# Patient Record
Sex: MALE | Race: WHITE | Employment: OTHER | URBAN - METROPOLITAN AREA
[De-identification: names, ages, dates, MRNs, and addresses within clinical notes are randomized per-mention and may not be internally consistent; named-entity substitution may affect disease eponyms.]

---

## 2018-01-01 ENCOUNTER — APPOINTMENT (OUTPATIENT)
Dept: GENERAL RADIOLOGY | Age: 63
End: 2018-01-01
Attending: INTERNAL MEDICINE

## 2018-01-01 ENCOUNTER — HOSPITAL ENCOUNTER (OUTPATIENT)
Dept: GENERAL RADIOLOGY | Age: 63
Discharge: HOME | End: 2018-12-26
Attending: INTERNAL MEDICINE

## 2018-01-01 ENCOUNTER — HOSPITAL ENCOUNTER (OUTPATIENT)
Dept: GENERAL RADIOLOGY | Age: 63
Discharge: HOME | End: 2018-12-27
Attending: INTERNAL MEDICINE

## 2018-01-01 ENCOUNTER — HOSPITAL ENCOUNTER (OUTPATIENT)
Age: 63
Discharge: ANOTHER HOSPITAL | End: 2019-01-17
Attending: INTERNAL MEDICINE | Admitting: INTERNAL MEDICINE

## 2018-01-01 ENCOUNTER — HOSPITAL ENCOUNTER (OUTPATIENT)
Dept: GENERAL RADIOLOGY | Age: 63
End: 2018-01-01
Attending: INTERNAL MEDICINE

## 2018-01-01 ENCOUNTER — HOSPITAL ENCOUNTER (OUTPATIENT)
Dept: GENERAL RADIOLOGY | Age: 63
Discharge: HOME | End: 2018-12-28
Attending: INTERNAL MEDICINE

## 2018-01-01 LAB
ALBUMIN SERPL-MCNC: 1.9 G/DL (ref 3.2–4.6)
ALBUMIN SERPL-MCNC: 2.5 G/DL (ref 3.2–4.6)
ALBUMIN/GLOB SERPL: 0.5 {RATIO} (ref 1.2–3.5)
ALBUMIN/GLOB SERPL: 0.7 {RATIO} (ref 1.2–3.5)
ALP SERPL-CCNC: 108 U/L (ref 50–136)
ALP SERPL-CCNC: 98 U/L (ref 50–136)
ALT SERPL-CCNC: 37 U/L (ref 12–65)
ALT SERPL-CCNC: 38 U/L (ref 12–65)
ANION GAP SERPL CALC-SCNC: 5 MMOL/L (ref 7–16)
ANION GAP SERPL CALC-SCNC: 6 MMOL/L (ref 7–16)
ANION GAP SERPL CALC-SCNC: 8 MMOL/L (ref 7–16)
ANION GAP SERPL CALC-SCNC: 8 MMOL/L (ref 7–16)
AST SERPL-CCNC: 12 U/L (ref 15–37)
AST SERPL-CCNC: 23 U/L (ref 15–37)
BASOPHILS # BLD: 0 K/UL (ref 0–0.2)
BASOPHILS NFR BLD: 0 % (ref 0–2)
BILIRUB SERPL-MCNC: 0.3 MG/DL (ref 0.2–1.1)
BILIRUB SERPL-MCNC: 0.4 MG/DL (ref 0.2–1.1)
BNP SERPL-MCNC: 305 PG/ML
BNP SERPL-MCNC: 369 PG/ML
BUN SERPL-MCNC: 23 MG/DL (ref 8–23)
BUN SERPL-MCNC: 25 MG/DL (ref 8–23)
BUN SERPL-MCNC: 33 MG/DL (ref 8–23)
BUN SERPL-MCNC: 33 MG/DL (ref 8–23)
CALCIUM SERPL-MCNC: 8.1 MG/DL (ref 8.3–10.4)
CALCIUM SERPL-MCNC: 8.5 MG/DL (ref 8.3–10.4)
CHLORIDE SERPL-SCNC: 100 MMOL/L (ref 98–107)
CHLORIDE SERPL-SCNC: 101 MMOL/L (ref 98–107)
CO2 SERPL-SCNC: 29 MMOL/L (ref 21–32)
CO2 SERPL-SCNC: 29 MMOL/L (ref 21–32)
CO2 SERPL-SCNC: 31 MMOL/L (ref 21–32)
CO2 SERPL-SCNC: 33 MMOL/L (ref 21–32)
CREAT SERPL-MCNC: 0.82 MG/DL (ref 0.8–1.5)
CREAT SERPL-MCNC: 0.94 MG/DL (ref 0.8–1.5)
CREAT SERPL-MCNC: 0.98 MG/DL (ref 0.8–1.5)
CREAT SERPL-MCNC: 1.05 MG/DL (ref 0.8–1.5)
DIFFERENTIAL METHOD BLD: ABNORMAL
EOSINOPHIL # BLD: 0.1 K/UL (ref 0–0.8)
EOSINOPHIL # BLD: 0.1 K/UL (ref 0–0.8)
EOSINOPHIL # BLD: 0.2 K/UL (ref 0–0.8)
EOSINOPHIL # BLD: 0.4 K/UL (ref 0–0.8)
EOSINOPHIL NFR BLD: 2 % (ref 0.5–7.8)
EOSINOPHIL NFR BLD: 6 % (ref 0.5–7.8)
ERYTHROCYTE [DISTWIDTH] IN BLOOD BY AUTOMATED COUNT: 13.6 % (ref 11.9–14.6)
ERYTHROCYTE [DISTWIDTH] IN BLOOD BY AUTOMATED COUNT: 13.7 % (ref 11.9–14.6)
ERYTHROCYTE [DISTWIDTH] IN BLOOD BY AUTOMATED COUNT: 13.7 % (ref 11.9–14.6)
ERYTHROCYTE [DISTWIDTH] IN BLOOD BY AUTOMATED COUNT: 13.8 % (ref 11.9–14.6)
GLOBULIN SER CALC-MCNC: 3.5 G/DL (ref 2.3–3.5)
GLOBULIN SER CALC-MCNC: 3.5 G/DL (ref 2.3–3.5)
GLUCOSE SERPL-MCNC: 101 MG/DL (ref 65–100)
GLUCOSE SERPL-MCNC: 108 MG/DL (ref 65–100)
GLUCOSE SERPL-MCNC: 130 MG/DL (ref 65–100)
GLUCOSE SERPL-MCNC: 130 MG/DL (ref 65–100)
HCT VFR BLD AUTO: 26.2 % (ref 41.1–50.3)
HCT VFR BLD AUTO: 27.1 % (ref 41.1–50.3)
HCT VFR BLD AUTO: 29.5 % (ref 41.1–50.3)
HCT VFR BLD AUTO: 31.1 % (ref 41.1–50.3)
HGB BLD-MCNC: 8.1 G/DL (ref 13.6–17.2)
HGB BLD-MCNC: 8.7 G/DL (ref 13.6–17.2)
HGB BLD-MCNC: 9.2 G/DL (ref 13.6–17.2)
HGB BLD-MCNC: 9.6 G/DL (ref 13.6–17.2)
IMM GRANULOCYTES # BLD: 0.1 K/UL (ref 0–0.5)
IMM GRANULOCYTES NFR BLD AUTO: 1 % (ref 0–5)
LYMPHOCYTES # BLD: 0.4 K/UL (ref 0.5–4.6)
LYMPHOCYTES # BLD: 0.8 K/UL (ref 0.5–4.6)
LYMPHOCYTES NFR BLD: 10 % (ref 13–44)
LYMPHOCYTES NFR BLD: 11 % (ref 13–44)
LYMPHOCYTES NFR BLD: 6 % (ref 13–44)
LYMPHOCYTES NFR BLD: 9 % (ref 13–44)
MAGNESIUM SERPL-MCNC: 1.9 MG/DL (ref 1.8–2.4)
MCH RBC QN AUTO: 32.5 PG (ref 26.1–32.9)
MCH RBC QN AUTO: 32.7 PG (ref 26.1–32.9)
MCH RBC QN AUTO: 32.7 PG (ref 26.1–32.9)
MCH RBC QN AUTO: 33.1 PG (ref 26.1–32.9)
MCHC RBC AUTO-ENTMCNC: 30.9 G/DL (ref 31.4–35)
MCHC RBC AUTO-ENTMCNC: 30.9 G/DL (ref 31.4–35)
MCHC RBC AUTO-ENTMCNC: 31.2 G/DL (ref 31.4–35)
MCHC RBC AUTO-ENTMCNC: 32.1 G/DL (ref 31.4–35)
MCV RBC AUTO: 103 FL (ref 79.6–97.8)
MCV RBC AUTO: 105 FL (ref 79.6–97.8)
MCV RBC AUTO: 105.2 FL (ref 79.6–97.8)
MCV RBC AUTO: 105.8 FL (ref 79.6–97.8)
MONOCYTES # BLD: 0.4 K/UL (ref 0.1–1.3)
MONOCYTES # BLD: 0.6 K/UL (ref 0.1–1.3)
MONOCYTES NFR BLD: 5 % (ref 4–12)
MONOCYTES NFR BLD: 7 % (ref 4–12)
MONOCYTES NFR BLD: 8 % (ref 4–12)
MONOCYTES NFR BLD: 8 % (ref 4–12)
NEUTS SEG # BLD: 5 K/UL (ref 1.7–8.2)
NEUTS SEG # BLD: 6.1 K/UL (ref 1.7–8.2)
NEUTS SEG # BLD: 6.8 K/UL (ref 1.7–8.2)
NEUTS SEG # BLD: 6.9 K/UL (ref 1.7–8.2)
NEUTS SEG NFR BLD: 74 % (ref 43–78)
NEUTS SEG NFR BLD: 81 % (ref 43–78)
NEUTS SEG NFR BLD: 83 % (ref 43–78)
NEUTS SEG NFR BLD: 83 % (ref 43–78)
NRBC # BLD: 0 K/UL (ref 0–0.2)
PLATELET # BLD AUTO: 243 K/UL (ref 150–450)
PLATELET # BLD AUTO: 263 K/UL (ref 150–450)
PLATELET # BLD AUTO: 285 K/UL (ref 150–450)
PLATELET # BLD AUTO: 294 K/UL (ref 150–450)
PMV BLD AUTO: 10.1 FL (ref 9.4–12.3)
PMV BLD AUTO: 10.4 FL (ref 9.4–12.3)
PMV BLD AUTO: 10.4 FL (ref 9.4–12.3)
PMV BLD AUTO: 9.8 FL (ref 9.4–12.3)
POTASSIUM SERPL-SCNC: 3.8 MMOL/L (ref 3.5–5.1)
POTASSIUM SERPL-SCNC: 3.9 MMOL/L (ref 3.5–5.1)
POTASSIUM SERPL-SCNC: 4.2 MMOL/L (ref 3.5–5.1)
POTASSIUM SERPL-SCNC: 4.3 MMOL/L (ref 3.5–5.1)
PROCALCITONIN SERPL-MCNC: 0.1 NG/ML
PROT SERPL-MCNC: 5.4 G/DL (ref 6.3–8.2)
PROT SERPL-MCNC: 6 G/DL (ref 6.3–8.2)
RBC # BLD AUTO: 2.49 M/UL (ref 4.23–5.6)
RBC # BLD AUTO: 2.63 M/UL (ref 4.23–5.6)
RBC # BLD AUTO: 2.81 M/UL (ref 4.23–5.6)
RBC # BLD AUTO: 2.94 M/UL (ref 4.23–5.6)
SODIUM SERPL-SCNC: 137 MMOL/L (ref 136–145)
SODIUM SERPL-SCNC: 138 MMOL/L (ref 136–145)
SODIUM SERPL-SCNC: 138 MMOL/L (ref 136–145)
SODIUM SERPL-SCNC: 139 MMOL/L (ref 136–145)
WBC # BLD AUTO: 6.8 K/UL (ref 4.3–11.1)
WBC # BLD AUTO: 7.4 K/UL (ref 4.3–11.1)
WBC # BLD AUTO: 8.4 K/UL (ref 4.3–11.1)
WBC # BLD AUTO: 8.5 K/UL (ref 4.3–11.1)

## 2018-01-01 PROCEDURE — 85025 COMPLETE CBC W/AUTO DIFF WBC: CPT

## 2018-01-01 PROCEDURE — 71045 X-RAY EXAM CHEST 1 VIEW: CPT

## 2018-01-01 PROCEDURE — 83880 ASSAY OF NATRIURETIC PEPTIDE: CPT

## 2018-01-01 PROCEDURE — 36415 COLL VENOUS BLD VENIPUNCTURE: CPT

## 2018-01-01 PROCEDURE — 80053 COMPREHEN METABOLIC PANEL: CPT

## 2018-01-01 PROCEDURE — 80048 BASIC METABOLIC PNL TOTAL CA: CPT

## 2018-01-01 PROCEDURE — 83735 ASSAY OF MAGNESIUM: CPT

## 2018-01-01 PROCEDURE — 84145 PROCALCITONIN (PCT): CPT

## 2018-01-01 PROCEDURE — 93312 ECHO TRANSESOPHAGEAL: CPT

## 2019-01-01 ENCOUNTER — APPOINTMENT (OUTPATIENT)
Dept: GENERAL RADIOLOGY | Age: 64
DRG: 871 | End: 2019-01-01
Attending: INTERNAL MEDICINE
Payer: MEDICARE

## 2019-01-01 ENCOUNTER — APPOINTMENT (OUTPATIENT)
Dept: CT IMAGING | Age: 64
End: 2019-01-01
Attending: NURSE PRACTITIONER

## 2019-01-01 ENCOUNTER — APPOINTMENT (OUTPATIENT)
Dept: GENERAL RADIOLOGY | Age: 64
End: 2019-01-01
Attending: INTERNAL MEDICINE

## 2019-01-01 ENCOUNTER — APPOINTMENT (OUTPATIENT)
Dept: GENERAL RADIOLOGY | Age: 64
End: 2019-01-01
Attending: NURSE PRACTITIONER

## 2019-01-01 ENCOUNTER — HOSPITAL ENCOUNTER (INPATIENT)
Age: 64
LOS: 1 days | DRG: 871 | End: 2019-01-18
Attending: INTERNAL MEDICINE | Admitting: INTERNAL MEDICINE
Payer: MEDICARE

## 2019-01-01 VITALS
DIASTOLIC BLOOD PRESSURE: 44 MMHG | BODY MASS INDEX: 29.84 KG/M2 | OXYGEN SATURATION: 52 % | SYSTOLIC BLOOD PRESSURE: 96 MMHG | WEIGHT: 201.5 LBS | HEIGHT: 69 IN | TEMPERATURE: 97.7 F | RESPIRATION RATE: 7 BRPM

## 2019-01-01 DIAGNOSIS — I46.9 CARDIAC ARREST (HCC): ICD-10-CM

## 2019-01-01 DIAGNOSIS — R65.21 SEVERE SEPSIS WITH SEPTIC SHOCK (HCC): ICD-10-CM

## 2019-01-01 DIAGNOSIS — R41.82 ALTERED MENTAL STATUS, UNSPECIFIED ALTERED MENTAL STATUS TYPE: ICD-10-CM

## 2019-01-01 DIAGNOSIS — I95.9 HYPOTENSION, UNSPECIFIED HYPOTENSION TYPE: ICD-10-CM

## 2019-01-01 DIAGNOSIS — B95.62 BACTEREMIA DUE TO METHICILLIN RESISTANT STAPHYLOCOCCUS AUREUS: ICD-10-CM

## 2019-01-01 DIAGNOSIS — Z51.5 ENCOUNTER FOR PALLIATIVE CARE: ICD-10-CM

## 2019-01-01 DIAGNOSIS — N17.9 AKI (ACUTE KIDNEY INJURY) (HCC): ICD-10-CM

## 2019-01-01 DIAGNOSIS — A41.9 SEVERE SEPSIS WITH SEPTIC SHOCK (HCC): ICD-10-CM

## 2019-01-01 DIAGNOSIS — R78.81 BACTEREMIA DUE TO METHICILLIN RESISTANT STAPHYLOCOCCUS AUREUS: ICD-10-CM

## 2019-01-01 DIAGNOSIS — J96.01 ACUTE RESPIRATORY FAILURE WITH HYPOXIA (HCC): ICD-10-CM

## 2019-01-01 DIAGNOSIS — J86.9 EMPYEMA LUNG (HCC): ICD-10-CM

## 2019-01-01 DIAGNOSIS — F41.9 ANXIETY: ICD-10-CM

## 2019-01-01 DIAGNOSIS — J44.9 CHRONIC OBSTRUCTIVE PULMONARY DISEASE, UNSPECIFIED COPD TYPE (HCC): Chronic | ICD-10-CM

## 2019-01-01 LAB
ABO + RH BLD: NORMAL
ALBUMIN SERPL-MCNC: 1.3 G/DL (ref 3.2–4.6)
ALBUMIN SERPL-MCNC: 1.3 G/DL (ref 3.2–4.6)
ALBUMIN SERPL-MCNC: 1.7 G/DL (ref 3.2–4.6)
ALBUMIN SERPL-MCNC: 2 G/DL (ref 3.2–4.6)
ALBUMIN/GLOB SERPL: 0.3 {RATIO} (ref 1.2–3.5)
ALBUMIN/GLOB SERPL: 0.4 {RATIO} (ref 1.2–3.5)
ALBUMIN/GLOB SERPL: 0.4 {RATIO} (ref 1.2–3.5)
ALBUMIN/GLOB SERPL: 0.5 {RATIO} (ref 1.2–3.5)
ALP SERPL-CCNC: 122 U/L (ref 50–136)
ALP SERPL-CCNC: 153 U/L (ref 50–136)
ALP SERPL-CCNC: 68 U/L (ref 50–136)
ALP SERPL-CCNC: 89 U/L (ref 50–136)
ALT SERPL-CCNC: 29 U/L (ref 12–65)
ALT SERPL-CCNC: 33 U/L (ref 12–65)
ALT SERPL-CCNC: 42 U/L (ref 12–65)
ALT SERPL-CCNC: 50 U/L (ref 12–65)
ANION GAP SERPL CALC-SCNC: 1 MMOL/L (ref 7–16)
ANION GAP SERPL CALC-SCNC: 10 MMOL/L (ref 7–16)
ANION GAP SERPL CALC-SCNC: 4 MMOL/L (ref 7–16)
ANION GAP SERPL CALC-SCNC: 5 MMOL/L (ref 7–16)
ANION GAP SERPL CALC-SCNC: 6 MMOL/L (ref 7–16)
ANION GAP SERPL CALC-SCNC: 6 MMOL/L (ref 7–16)
ANION GAP SERPL CALC-SCNC: 7 MMOL/L (ref 7–16)
ANION GAP SERPL CALC-SCNC: 8 MMOL/L (ref 7–16)
APPEARANCE FLD: NORMAL
ARTERIAL PATENCY WRIST A: ABNORMAL
AST SERPL-CCNC: 15 U/L (ref 15–37)
AST SERPL-CCNC: 27 U/L (ref 15–37)
AST SERPL-CCNC: 27 U/L (ref 15–37)
AST SERPL-CCNC: 32 U/L (ref 15–37)
ATRIAL RATE: 26 BPM
BACTERIA SPEC CULT: ABNORMAL
BACTERIA SPEC CULT: ABNORMAL
BASE DEFICIT BLD-SCNC: 2 MMOL/L
BASE DEFICIT BLD-SCNC: 5 MMOL/L
BASE DEFICIT BLD-SCNC: 5 MMOL/L
BASE DEFICIT BLD-SCNC: 7 MMOL/L
BASE DEFICIT BLD-SCNC: 7 MMOL/L
BASOPHILS # BLD: 0 K/UL (ref 0–0.2)
BASOPHILS # BLD: 0.1 K/UL (ref 0–0.2)
BASOPHILS NFR BLD: 0 % (ref 0–2)
BASOPHILS NFR BLD: 1 % (ref 0–2)
BDY SITE: ABNORMAL
BILIRUB SERPL-MCNC: 0.2 MG/DL (ref 0.2–1.1)
BILIRUB SERPL-MCNC: 0.3 MG/DL (ref 0.2–1.1)
BILIRUB SERPL-MCNC: 0.5 MG/DL (ref 0.2–1.1)
BILIRUB SERPL-MCNC: 0.6 MG/DL (ref 0.2–1.1)
BLD PROD TYP BPU: NORMAL
BLOOD GROUP ANTIBODIES SERPL: NORMAL
BNP SERPL-MCNC: 424 PG/ML
BNP SERPL-MCNC: 449 PG/ML
BNP SERPL-MCNC: 581 PG/ML
BNP SERPL-MCNC: 756 PG/ML
BODY TEMPERATURE: 98.6
BPU ID: NORMAL
BUN SERPL-MCNC: 104 MG/DL (ref 8–23)
BUN SERPL-MCNC: 107 MG/DL (ref 8–23)
BUN SERPL-MCNC: 107 MG/DL (ref 8–23)
BUN SERPL-MCNC: 109 MG/DL (ref 8–23)
BUN SERPL-MCNC: 26 MG/DL (ref 8–23)
BUN SERPL-MCNC: 28 MG/DL (ref 8–23)
BUN SERPL-MCNC: 29 MG/DL (ref 8–23)
BUN SERPL-MCNC: 30 MG/DL (ref 8–23)
BUN SERPL-MCNC: 32 MG/DL (ref 8–23)
BUN SERPL-MCNC: 34 MG/DL (ref 8–23)
BUN SERPL-MCNC: 38 MG/DL (ref 8–23)
BUN SERPL-MCNC: 50 MG/DL (ref 8–23)
BUN SERPL-MCNC: 94 MG/DL (ref 8–23)
CA-I BLD-MCNC: 4 MG/DL (ref 4–5.2)
CALCIUM SERPL-MCNC: 6.1 MG/DL (ref 8.3–10.4)
CALCIUM SERPL-MCNC: 6.9 MG/DL (ref 8.3–10.4)
CALCIUM SERPL-MCNC: 7 MG/DL (ref 8.3–10.4)
CALCIUM SERPL-MCNC: 8 MG/DL (ref 8.3–10.4)
CALCIUM SERPL-MCNC: 8 MG/DL (ref 8.3–10.4)
CALCIUM SERPL-MCNC: 8.4 MG/DL (ref 8.3–10.4)
CALCIUM SERPL-MCNC: 8.6 MG/DL (ref 8.3–10.4)
CALCIUM SERPL-MCNC: 8.6 MG/DL (ref 8.3–10.4)
CALCIUM SERPL-MCNC: 8.7 MG/DL (ref 8.3–10.4)
CALCIUM SERPL-MCNC: 8.8 MG/DL (ref 8.3–10.4)
CALCIUM SERPL-MCNC: 8.9 MG/DL (ref 8.3–10.4)
CALCULATED R AXIS, ECG10: 98 DEGREES
CALCULATED T AXIS, ECG11: 68 DEGREES
CHLORIDE SERPL-SCNC: 100 MMOL/L (ref 98–107)
CHLORIDE SERPL-SCNC: 104 MMOL/L (ref 98–107)
CHLORIDE SERPL-SCNC: 110 MMOL/L (ref 98–107)
CHLORIDE SERPL-SCNC: 89 MMOL/L (ref 98–107)
CHLORIDE SERPL-SCNC: 90 MMOL/L (ref 98–107)
CHLORIDE SERPL-SCNC: 90 MMOL/L (ref 98–107)
CHLORIDE SERPL-SCNC: 91 MMOL/L (ref 98–107)
CHLORIDE SERPL-SCNC: 94 MMOL/L (ref 98–107)
CHLORIDE SERPL-SCNC: 99 MMOL/L (ref 98–107)
CHYLOMICRON SCREEN, CHYLMT: NORMAL
CO2 BLD-SCNC: 19 MMOL/L
CO2 BLD-SCNC: 22 MMOL/L
CO2 BLD-SCNC: 23 MMOL/L
CO2 BLD-SCNC: 26 MMOL/L
CO2 BLD-SCNC: 27 MMOL/L
CO2 SERPL-SCNC: 21 MMOL/L (ref 21–32)
CO2 SERPL-SCNC: 26 MMOL/L (ref 21–32)
CO2 SERPL-SCNC: 29 MMOL/L (ref 21–32)
CO2 SERPL-SCNC: 33 MMOL/L (ref 21–32)
CO2 SERPL-SCNC: 33 MMOL/L (ref 21–32)
CO2 SERPL-SCNC: 36 MMOL/L (ref 21–32)
CO2 SERPL-SCNC: 37 MMOL/L (ref 21–32)
CO2 SERPL-SCNC: 37 MMOL/L (ref 21–32)
CO2 SERPL-SCNC: 38 MMOL/L (ref 21–32)
CO2 SERPL-SCNC: 39 MMOL/L (ref 21–32)
CO2 SERPL-SCNC: 40 MMOL/L (ref 21–32)
COLLECT TIME,HTIME: 1309
COLLECT TIME,HTIME: 1740
COLLECT TIME,HTIME: 1850
COLLECT TIME,HTIME: 2104
COLLECT TIME,HTIME: 235
COLOR FLD: NORMAL
CREAT SERPL-MCNC: 0.78 MG/DL (ref 0.8–1.5)
CREAT SERPL-MCNC: 0.81 MG/DL (ref 0.8–1.5)
CREAT SERPL-MCNC: 0.86 MG/DL (ref 0.8–1.5)
CREAT SERPL-MCNC: 0.88 MG/DL (ref 0.8–1.5)
CREAT SERPL-MCNC: 0.91 MG/DL (ref 0.8–1.5)
CREAT SERPL-MCNC: 0.96 MG/DL (ref 0.8–1.5)
CREAT SERPL-MCNC: 0.98 MG/DL (ref 0.8–1.5)
CREAT SERPL-MCNC: 0.99 MG/DL (ref 0.8–1.5)
CREAT SERPL-MCNC: 1.56 MG/DL (ref 0.8–1.5)
CREAT SERPL-MCNC: 1.78 MG/DL (ref 0.8–1.5)
CREAT SERPL-MCNC: 1.88 MG/DL (ref 0.8–1.5)
CREAT SERPL-MCNC: 1.95 MG/DL (ref 0.8–1.5)
CREAT SERPL-MCNC: 2.04 MG/DL (ref 0.8–1.5)
CROSSMATCH RESULT,%XM: NORMAL
CRP SERPL-MCNC: 12 MG/DL (ref 0–0.9)
DIAGNOSIS, 93000: NORMAL
DIFFERENTIAL METHOD BLD: ABNORMAL
EOSINOPHIL # BLD: 0 K/UL (ref 0–0.8)
EOSINOPHIL # BLD: 0.3 K/UL (ref 0–0.8)
EOSINOPHIL # BLD: 0.3 K/UL (ref 0–0.8)
EOSINOPHIL NFR BLD MANUAL: 3 % (ref 1–8)
EOSINOPHIL NFR BLD: 0 % (ref 0.5–7.8)
EOSINOPHIL NFR BLD: 3 % (ref 0.5–7.8)
ERYTHROCYTE [DISTWIDTH] IN BLOOD BY AUTOMATED COUNT: 13.6 % (ref 11.9–14.6)
ERYTHROCYTE [DISTWIDTH] IN BLOOD BY AUTOMATED COUNT: 14.3 % (ref 11.9–14.6)
ERYTHROCYTE [DISTWIDTH] IN BLOOD BY AUTOMATED COUNT: 14.6 % (ref 11.9–14.6)
ERYTHROCYTE [DISTWIDTH] IN BLOOD BY AUTOMATED COUNT: 15.1 % (ref 11.9–14.6)
ERYTHROCYTE [DISTWIDTH] IN BLOOD BY AUTOMATED COUNT: 16.3 % (ref 11.9–14.6)
ERYTHROCYTE [DISTWIDTH] IN BLOOD BY AUTOMATED COUNT: 16.4 % (ref 11.9–14.6)
ERYTHROCYTE [DISTWIDTH] IN BLOOD BY AUTOMATED COUNT: 16.4 % (ref 11.9–14.6)
ERYTHROCYTE [DISTWIDTH] IN BLOOD BY AUTOMATED COUNT: 17.4 % (ref 11.9–14.6)
EXHALED MINUTE VOLUME, VE: 11.1 L/MIN
EXHALED MINUTE VOLUME, VE: 11.9 L/MIN
EXHALED MINUTE VOLUME, VE: 12.1 L/MIN
EXHALED MINUTE VOLUME, VE: 14.8 L/MIN
FERRITIN SERPL-MCNC: 389 NG/ML (ref 8–388)
FOLATE SERPL-MCNC: 17.4 NG/ML (ref 3.1–17.5)
GAS FLOW.O2 O2 DELIVERY SYS: ABNORMAL L/MIN
GAS FLOW.O2 SETTING OXYMISER: 18 BPM
GAS FLOW.O2 SETTING OXYMISER: 20 BPM
GLOBULIN SER CALC-MCNC: 3.2 G/DL (ref 2.3–3.5)
GLOBULIN SER CALC-MCNC: 3.8 G/DL (ref 2.3–3.5)
GLOBULIN SER CALC-MCNC: 4.3 G/DL (ref 2.3–3.5)
GLOBULIN SER CALC-MCNC: 4.5 G/DL (ref 2.3–3.5)
GLUCOSE BLD STRIP.AUTO-MCNC: 184 MG/DL (ref 65–100)
GLUCOSE FLD-MCNC: 9 MG/DL
GLUCOSE SERPL-MCNC: 123 MG/DL (ref 65–100)
GLUCOSE SERPL-MCNC: 127 MG/DL (ref 65–100)
GLUCOSE SERPL-MCNC: 134 MG/DL (ref 65–100)
GLUCOSE SERPL-MCNC: 134 MG/DL (ref 65–100)
GLUCOSE SERPL-MCNC: 138 MG/DL (ref 65–100)
GLUCOSE SERPL-MCNC: 139 MG/DL (ref 65–100)
GLUCOSE SERPL-MCNC: 140 MG/DL (ref 65–100)
GLUCOSE SERPL-MCNC: 141 MG/DL (ref 65–100)
GLUCOSE SERPL-MCNC: 145 MG/DL (ref 65–100)
GLUCOSE SERPL-MCNC: 153 MG/DL (ref 65–100)
GLUCOSE SERPL-MCNC: 155 MG/DL (ref 65–100)
GLUCOSE SERPL-MCNC: 169 MG/DL (ref 65–100)
GLUCOSE SERPL-MCNC: 190 MG/DL (ref 65–100)
GRAM STN SPEC: ABNORMAL
GRAM STN SPEC: ABNORMAL
HCO3 BLD-SCNC: 17.7 MMOL/L (ref 22–26)
HCO3 BLD-SCNC: 20.6 MMOL/L (ref 22–26)
HCO3 BLD-SCNC: 21.6 MMOL/L (ref 22–26)
HCO3 BLD-SCNC: 24.5 MMOL/L (ref 22–26)
HCO3 BLD-SCNC: 25.1 MMOL/L (ref 22–26)
HCT VFR BLD AUTO: 24.2 % (ref 41.1–50.3)
HCT VFR BLD AUTO: 27.7 % (ref 41.1–50.3)
HCT VFR BLD AUTO: 27.8 % (ref 41.1–50.3)
HCT VFR BLD AUTO: 28 % (ref 41.1–50.3)
HCT VFR BLD AUTO: 28.4 % (ref 41.1–50.3)
HCT VFR BLD AUTO: 30 % (ref 41.1–50.3)
HCT VFR BLD AUTO: 31.6 % (ref 41.1–50.3)
HCT VFR BLD AUTO: 31.8 % (ref 41.1–50.3)
HGB BLD-MCNC: 7.3 G/DL (ref 13.6–17.2)
HGB BLD-MCNC: 8.5 G/DL (ref 13.6–17.2)
HGB BLD-MCNC: 8.6 G/DL (ref 13.6–17.2)
HGB BLD-MCNC: 8.6 G/DL (ref 13.6–17.2)
HGB BLD-MCNC: 8.9 G/DL (ref 13.6–17.2)
HGB BLD-MCNC: 9.2 G/DL (ref 13.6–17.2)
HGB BLD-MCNC: 9.4 G/DL (ref 13.6–17.2)
HGB BLD-MCNC: 9.8 G/DL (ref 13.6–17.2)
IMM GRANULOCYTES # BLD AUTO: 0 K/UL (ref 0–0.5)
IMM GRANULOCYTES # BLD AUTO: 0.1 K/UL (ref 0–0.5)
IMM GRANULOCYTES # BLD AUTO: 0.1 K/UL (ref 0–0.5)
IMM GRANULOCYTES # BLD: 0.1 K/UL (ref 0–0.5)
IMM GRANULOCYTES NFR BLD AUTO: 1 % (ref 0–5)
INSPIRATION.DURATION SETTING TIME VENT: 2.1 SEC
INSPIRATION.DURATION SETTING TIME VENT: 2.2 SEC
IRON SATN MFR SERPL: 6 %
IRON SERPL-MCNC: 11 UG/DL (ref 35–150)
LACTATE SERPL-SCNC: 2.2 MMOL/L (ref 0.4–2)
LDH FLD L TO P-CCNC: >3325 U/L
LYMPHOCYTES # BLD: 0.4 K/UL (ref 0.5–4.6)
LYMPHOCYTES # BLD: 0.4 K/UL (ref 0.5–4.6)
LYMPHOCYTES # BLD: 0.5 K/UL (ref 0.5–4.6)
LYMPHOCYTES # BLD: 0.8 K/UL (ref 0.5–4.6)
LYMPHOCYTES # BLD: 0.9 K/UL (ref 0.5–4.6)
LYMPHOCYTES NFR BLD MANUAL: 9 % (ref 16–44)
LYMPHOCYTES NFR BLD: 3 % (ref 13–44)
LYMPHOCYTES NFR BLD: 6 % (ref 13–44)
LYMPHOCYTES NFR BLD: 6 % (ref 13–44)
LYMPHOCYTES NFR BLD: 8 % (ref 13–44)
LYMPHOCYTES NFR FLD: 42 %
MAGNESIUM SERPL-MCNC: 2.7 MG/DL (ref 1.8–2.4)
MCH RBC QN AUTO: 30.2 PG (ref 26.1–32.9)
MCH RBC QN AUTO: 30.5 PG (ref 26.1–32.9)
MCH RBC QN AUTO: 30.7 PG (ref 26.1–32.9)
MCH RBC QN AUTO: 30.9 PG (ref 26.1–32.9)
MCH RBC QN AUTO: 31.2 PG (ref 26.1–32.9)
MCH RBC QN AUTO: 31.4 PG (ref 26.1–32.9)
MCH RBC QN AUTO: 31.6 PG (ref 26.1–32.9)
MCH RBC QN AUTO: 32.3 PG (ref 26.1–32.9)
MCHC RBC AUTO-ENTMCNC: 29.7 G/DL (ref 31.4–35)
MCHC RBC AUTO-ENTMCNC: 30.2 G/DL (ref 31.4–35)
MCHC RBC AUTO-ENTMCNC: 30.4 G/DL (ref 31.4–35)
MCHC RBC AUTO-ENTMCNC: 30.7 G/DL (ref 31.4–35)
MCHC RBC AUTO-ENTMCNC: 30.8 G/DL (ref 31.4–35)
MCHC RBC AUTO-ENTMCNC: 30.9 G/DL (ref 31.4–35)
MCHC RBC AUTO-ENTMCNC: 31 G/DL (ref 31.4–35)
MCHC RBC AUTO-ENTMCNC: 31.3 G/DL (ref 31.4–35)
MCV RBC AUTO: 101.3 FL (ref 79.6–97.8)
MCV RBC AUTO: 101.6 FL (ref 79.6–97.8)
MCV RBC AUTO: 101.7 FL (ref 79.6–97.8)
MCV RBC AUTO: 103.1 FL (ref 79.6–97.8)
MCV RBC AUTO: 103.3 FL (ref 79.6–97.8)
MCV RBC AUTO: 104.5 FL (ref 79.6–97.8)
MCV RBC AUTO: 98.2 FL (ref 79.6–97.8)
MCV RBC AUTO: 98.6 FL (ref 79.6–97.8)
METAMYELOCYTES NFR BLD MANUAL: 1 %
MONOCYTES # BLD: 0.4 K/UL (ref 0.1–1.3)
MONOCYTES # BLD: 0.7 K/UL (ref 0.1–1.3)
MONOCYTES # BLD: 0.7 K/UL (ref 0.1–1.3)
MONOCYTES # BLD: 0.8 K/UL (ref 0.1–1.3)
MONOCYTES # BLD: 0.9 K/UL (ref 0.1–1.3)
MONOCYTES NFR BLD MANUAL: 10 % (ref 3–9)
MONOCYTES NFR BLD: 6 % (ref 4–12)
MONOCYTES NFR BLD: 6 % (ref 4–12)
MONOCYTES NFR BLD: 7 % (ref 4–12)
MONOCYTES NFR BLD: 8 % (ref 4–12)
MYELOCYTES NFR BLD MANUAL: 2 %
NEUTROPHILS NFR FLD: 58 %
NEUTS BAND NFR BLD MANUAL: 1 % (ref 0–10)
NEUTS SEG # BLD: 11.5 K/UL (ref 1.7–8.2)
NEUTS SEG # BLD: 5.3 K/UL (ref 1.7–8.2)
NEUTS SEG # BLD: 6.9 K/UL (ref 1.7–8.2)
NEUTS SEG # BLD: 7.2 K/UL (ref 1.7–8.2)
NEUTS SEG # BLD: 8.9 K/UL (ref 1.7–8.2)
NEUTS SEG NFR BLD MANUAL: 74 % (ref 47–75)
NEUTS SEG NFR BLD: 81 % (ref 43–78)
NEUTS SEG NFR BLD: 85 % (ref 43–78)
NEUTS SEG NFR BLD: 86 % (ref 43–78)
NEUTS SEG NFR BLD: 90 % (ref 43–78)
NRBC # BLD: 0 K/UL (ref 0–0.2)
NRBC # BLD: 0.02 K/UL (ref 0–0.2)
NRBC # BLD: 0.02 K/UL (ref 0–0.2)
NRBC # BLD: 0.03 K/UL (ref 0–0.2)
NRBC # BLD: 0.05 K/UL (ref 0–0.2)
NRBC # BLD: 0.08 K/UL (ref 0–0.2)
NUC CELL # FLD: NORMAL /CU MM
O2/TOTAL GAS SETTING VFR VENT: 100 %
O2/TOTAL GAS SETTING VFR VENT: 70 %
PCO2 BLD: 29.8 MMHG (ref 35–45)
PCO2 BLD: 44.6 MMHG (ref 35–45)
PCO2 BLD: 50.4 MMHG (ref 35–45)
PCO2 BLD: 52 MMHG (ref 35–45)
PCO2 BLD: 74 MMHG (ref 35–45)
PEEP RESPIRATORY: 16 CMH2O
PH BLD: 7.14 [PH] (ref 7.35–7.45)
PH BLD: 7.22 [PH] (ref 7.35–7.45)
PH BLD: 7.28 [PH] (ref 7.35–7.45)
PH BLD: 7.29 [PH] (ref 7.35–7.45)
PH BLD: 7.38 [PH] (ref 7.35–7.45)
PHOSPHATE SERPL-MCNC: 5.8 MG/DL (ref 2.3–3.7)
PLATELET # BLD AUTO: 121 K/UL (ref 150–450)
PLATELET # BLD AUTO: 183 K/UL (ref 150–450)
PLATELET # BLD AUTO: 258 K/UL (ref 150–450)
PLATELET # BLD AUTO: 267 K/UL (ref 150–450)
PLATELET # BLD AUTO: 274 K/UL (ref 150–450)
PLATELET # BLD AUTO: 322 K/UL (ref 150–450)
PLATELET # BLD AUTO: 365 K/UL (ref 150–450)
PLATELET # BLD AUTO: 500 K/UL (ref 150–450)
PLATELET COMMENTS,PCOM: ADEQUATE
PMV BLD AUTO: 10 FL (ref 9.4–12.3)
PMV BLD AUTO: 10.6 FL (ref 9.4–12.3)
PMV BLD AUTO: 10.7 FL (ref 9.4–12.3)
PMV BLD AUTO: 12.2 FL (ref 9.4–12.3)
PMV BLD AUTO: 9.7 FL (ref 9.4–12.3)
PMV BLD AUTO: 9.8 FL (ref 9.4–12.3)
PO2 BLD: 197 MMHG (ref 75–100)
PO2 BLD: 52 MMHG (ref 75–100)
PO2 BLD: 74 MMHG (ref 75–100)
PO2 BLD: 75 MMHG (ref 75–100)
PO2 BLD: 85 MMHG (ref 75–100)
POTASSIUM SERPL-SCNC: 4.2 MMOL/L (ref 3.5–5.1)
POTASSIUM SERPL-SCNC: 4.3 MMOL/L (ref 3.5–5.1)
POTASSIUM SERPL-SCNC: 4.4 MMOL/L (ref 3.5–5.1)
POTASSIUM SERPL-SCNC: 4.5 MMOL/L (ref 3.5–5.1)
POTASSIUM SERPL-SCNC: 4.5 MMOL/L (ref 3.5–5.1)
POTASSIUM SERPL-SCNC: 4.6 MMOL/L (ref 3.5–5.1)
POTASSIUM SERPL-SCNC: 4.6 MMOL/L (ref 3.5–5.1)
POTASSIUM SERPL-SCNC: 5.1 MMOL/L (ref 3.5–5.1)
POTASSIUM SERPL-SCNC: 5.2 MMOL/L (ref 3.5–5.1)
POTASSIUM SERPL-SCNC: 9.6 MMOL/L (ref 3.5–5.1)
PRESSURE CONTROL, IPC: 16
PRESSURE CONTROL, IPC: 20
PROT FLD-MCNC: 3.6 G/DL
PROT SERPL-MCNC: 4.5 G/DL (ref 6.3–8.2)
PROT SERPL-MCNC: 5.1 G/DL (ref 6.3–8.2)
PROT SERPL-MCNC: 6.2 G/DL (ref 6.3–8.2)
PROT SERPL-MCNC: 6.3 G/DL (ref 6.3–8.2)
Q-T INTERVAL, ECG07: 368 MS
QRS DURATION, ECG06: 142 MS
QTC CALCULATION (BEZET), ECG08: 507 MS
RBC # BLD AUTO: 2.38 M/UL (ref 4.23–5.6)
RBC # BLD AUTO: 2.66 M/UL (ref 4.23–5.6)
RBC # BLD AUTO: 2.71 M/UL (ref 4.23–5.6)
RBC # BLD AUTO: 2.82 M/UL (ref 4.23–5.6)
RBC # BLD AUTO: 2.88 M/UL (ref 4.23–5.6)
RBC # BLD AUTO: 2.91 M/UL (ref 4.23–5.6)
RBC # BLD AUTO: 3.11 M/UL (ref 4.23–5.6)
RBC # BLD AUTO: 3.14 M/UL (ref 4.23–5.6)
RBC # FLD: NORMAL /CU MM
RBC MORPH BLD: ABNORMAL
SAO2 % BLD: 100 % (ref 95–98)
SAO2 % BLD: 74 % (ref 95–98)
SAO2 % BLD: 91 % (ref 95–98)
SAO2 % BLD: 93 % (ref 95–98)
SAO2 % BLD: 95 % (ref 95–98)
SERVICE CMNT-IMP: ABNORMAL
SODIUM SERPL-SCNC: 130 MMOL/L (ref 136–145)
SODIUM SERPL-SCNC: 132 MMOL/L (ref 136–145)
SODIUM SERPL-SCNC: 132 MMOL/L (ref 136–145)
SODIUM SERPL-SCNC: 134 MMOL/L (ref 136–145)
SODIUM SERPL-SCNC: 135 MMOL/L (ref 136–145)
SODIUM SERPL-SCNC: 137 MMOL/L (ref 136–145)
SODIUM SERPL-SCNC: 138 MMOL/L (ref 136–145)
SODIUM SERPL-SCNC: 141 MMOL/L (ref 136–145)
SPECIMEN EXP DATE BLD: NORMAL
SPECIMEN SOURCE FLD: NORMAL
SPECIMEN SOURCE: NORMAL
SPECIMEN SOURCE: NORMAL
SPECIMEN TYPE: ABNORMAL
STATUS OF UNIT,%ST: NORMAL
TIBC SERPL-MCNC: 185 UG/DL (ref 250–450)
TRIGL FLD-MCNC: 27 MG/DL
UNIT DIVISION, %UDIV: 0
VANCOMYCIN TROUGH SERPL-MCNC: 21.4 UG/ML (ref 5–20)
VENTILATION MODE VENT: ABNORMAL
VENTRICULAR RATE, ECG03: 114 BPM
VIT B12 SERPL-MCNC: 614 PG/ML (ref 193–986)
VT SETTING VENT: 600 ML
VT SETTING VENT: 756 ML
WBC # BLD AUTO: 11 K/UL (ref 4.3–11.1)
WBC # BLD AUTO: 11.3 K/UL (ref 4.3–11.1)
WBC # BLD AUTO: 12.7 K/UL (ref 4.3–11.1)
WBC # BLD AUTO: 6.1 K/UL (ref 4.3–11.1)
WBC # BLD AUTO: 6.1 K/UL (ref 4.3–11.1)
WBC # BLD AUTO: 7.9 K/UL (ref 4.3–11.1)
WBC # BLD AUTO: 8.5 K/UL (ref 4.3–11.1)
WBC # BLD AUTO: 8.9 K/UL (ref 4.3–11.1)
WBC MORPH BLD: ABNORMAL

## 2019-01-01 PROCEDURE — 87077 CULTURE AEROBIC IDENTIFY: CPT

## 2019-01-01 PROCEDURE — 94003 VENT MGMT INPAT SUBQ DAY: CPT

## 2019-01-01 PROCEDURE — 74011000258 HC RX REV CODE- 258: Performed by: NURSE PRACTITIONER

## 2019-01-01 PROCEDURE — 74011000258 HC RX REV CODE- 258: Performed by: INTERNAL MEDICINE

## 2019-01-01 PROCEDURE — 36415 COLL VENOUS BLD VENIPUNCTURE: CPT

## 2019-01-01 PROCEDURE — 87641 MR-STAPH DNA AMP PROBE: CPT

## 2019-01-01 PROCEDURE — P9016 RBC LEUKOCYTES REDUCED: HCPCS

## 2019-01-01 PROCEDURE — 74011000250 HC RX REV CODE- 250: Performed by: INTERNAL MEDICINE

## 2019-01-01 PROCEDURE — 05HN33Z INSERTION OF INFUSION DEVICE INTO LEFT INTERNAL JUGULAR VEIN, PERCUTANEOUS APPROACH: ICD-10-PCS | Performed by: INTERNAL MEDICINE

## 2019-01-01 PROCEDURE — 99292 CRITICAL CARE ADDL 30 MIN: CPT | Performed by: INTERNAL MEDICINE

## 2019-01-01 PROCEDURE — 85025 COMPLETE CBC W/AUTO DIFF WBC: CPT

## 2019-01-01 PROCEDURE — 71045 X-RAY EXAM CHEST 1 VIEW: CPT

## 2019-01-01 PROCEDURE — 84478 ASSAY OF TRIGLYCERIDES: CPT

## 2019-01-01 PROCEDURE — C1751 CATH, INF, PER/CENT/MIDLINE: HCPCS

## 2019-01-01 PROCEDURE — 85027 COMPLETE CBC AUTOMATED: CPT

## 2019-01-01 PROCEDURE — 99223 1ST HOSP IP/OBS HIGH 75: CPT | Performed by: INTERNAL MEDICINE

## 2019-01-01 PROCEDURE — 74011636320 HC RX REV CODE- 636/320: Performed by: INTERNAL MEDICINE

## 2019-01-01 PROCEDURE — 74011250636 HC RX REV CODE- 250/636: Performed by: INTERNAL MEDICINE

## 2019-01-01 PROCEDURE — 92950 HEART/LUNG RESUSCITATION CPR: CPT

## 2019-01-01 PROCEDURE — 36620 INSERTION CATHETER ARTERY: CPT | Performed by: INTERNAL MEDICINE

## 2019-01-01 PROCEDURE — 80048 BASIC METABOLIC PNL TOTAL CA: CPT

## 2019-01-01 PROCEDURE — 83615 LACTATE (LD) (LDH) ENZYME: CPT

## 2019-01-01 PROCEDURE — 82728 ASSAY OF FERRITIN: CPT

## 2019-01-01 PROCEDURE — 86900 BLOOD TYPING SEROLOGIC ABO: CPT

## 2019-01-01 PROCEDURE — 82945 GLUCOSE OTHER FLUID: CPT

## 2019-01-01 PROCEDURE — 36556 INSERT NON-TUNNEL CV CATH: CPT

## 2019-01-01 PROCEDURE — 94640 AIRWAY INHALATION TREATMENT: CPT

## 2019-01-01 PROCEDURE — 83735 ASSAY OF MAGNESIUM: CPT

## 2019-01-01 PROCEDURE — 80053 COMPREHEN METABOLIC PANEL: CPT

## 2019-01-01 PROCEDURE — 93005 ELECTROCARDIOGRAM TRACING: CPT | Performed by: INTERNAL MEDICINE

## 2019-01-01 PROCEDURE — 36556 INSERT NON-TUNNEL CV CATH: CPT | Performed by: INTERNAL MEDICINE

## 2019-01-01 PROCEDURE — 84157 ASSAY OF PROTEIN OTHER: CPT

## 2019-01-01 PROCEDURE — 84100 ASSAY OF PHOSPHORUS: CPT

## 2019-01-01 PROCEDURE — 82962 GLUCOSE BLOOD TEST: CPT

## 2019-01-01 PROCEDURE — 83540 ASSAY OF IRON: CPT

## 2019-01-01 PROCEDURE — 77030019605

## 2019-01-01 PROCEDURE — 87116 MYCOBACTERIA CULTURE: CPT

## 2019-01-01 PROCEDURE — 74011250637 HC RX REV CODE- 250/637: Performed by: INTERNAL MEDICINE

## 2019-01-01 PROCEDURE — 36620 INSERTION CATHETER ARTERY: CPT

## 2019-01-01 PROCEDURE — 87186 SC STD MICRODIL/AGAR DIL: CPT

## 2019-01-01 PROCEDURE — 99291 CRITICAL CARE FIRST HOUR: CPT | Performed by: INTERNAL MEDICINE

## 2019-01-01 PROCEDURE — 65620000000 HC RM CCU GENERAL

## 2019-01-01 PROCEDURE — 94002 VENT MGMT INPAT INIT DAY: CPT

## 2019-01-01 PROCEDURE — 76450000000

## 2019-01-01 PROCEDURE — 82607 VITAMIN B-12: CPT

## 2019-01-01 PROCEDURE — 87205 SMEAR GRAM STAIN: CPT

## 2019-01-01 PROCEDURE — 77030020230

## 2019-01-01 PROCEDURE — 80202 ASSAY OF VANCOMYCIN: CPT

## 2019-01-01 PROCEDURE — 89050 BODY FLUID CELL COUNT: CPT

## 2019-01-01 PROCEDURE — 87102 FUNGUS ISOLATION CULTURE: CPT

## 2019-01-01 PROCEDURE — 82330 ASSAY OF CALCIUM: CPT

## 2019-01-01 PROCEDURE — P9047 ALBUMIN (HUMAN), 25%, 50ML: HCPCS | Performed by: INTERNAL MEDICINE

## 2019-01-01 PROCEDURE — 77030020263 HC SOL INJ SOD CL0.9% LFCR 1000ML

## 2019-01-01 PROCEDURE — 87086 URINE CULTURE/COLONY COUNT: CPT

## 2019-01-01 PROCEDURE — 83880 ASSAY OF NATRIURETIC PEPTIDE: CPT

## 2019-01-01 PROCEDURE — 86140 C-REACTIVE PROTEIN: CPT

## 2019-01-01 PROCEDURE — 74011250636 HC RX REV CODE- 250/636

## 2019-01-01 PROCEDURE — 82803 BLOOD GASES ANY COMBINATION: CPT

## 2019-01-01 PROCEDURE — 77030013794 HC KT TRNSDUC BLD EDWD -B

## 2019-01-01 PROCEDURE — 82746 ASSAY OF FOLIC ACID SERUM: CPT

## 2019-01-01 PROCEDURE — 71260 CT THORAX DX C+: CPT

## 2019-01-01 PROCEDURE — 82664 ELECTROPHORETIC TEST: CPT

## 2019-01-01 PROCEDURE — 99223 1ST HOSP IP/OBS HIGH 75: CPT | Performed by: NURSE PRACTITIONER

## 2019-01-01 PROCEDURE — 92950 HEART/LUNG RESUSCITATION CPR: CPT | Performed by: INTERNAL MEDICINE

## 2019-01-01 PROCEDURE — 74011000250 HC RX REV CODE- 250

## 2019-01-01 PROCEDURE — 74011000250 HC RX REV CODE- 250: Performed by: NURSE PRACTITIONER

## 2019-01-01 PROCEDURE — 87040 BLOOD CULTURE FOR BACTERIA: CPT

## 2019-01-01 PROCEDURE — 77030032490 HC SLV COMPR SCD KNE COVD -B

## 2019-01-01 PROCEDURE — 74018 RADEX ABDOMEN 1 VIEW: CPT

## 2019-01-01 PROCEDURE — 5A1935Z RESPIRATORY VENTILATION, LESS THAN 24 CONSECUTIVE HOURS: ICD-10-PCS | Performed by: INTERNAL MEDICINE

## 2019-01-01 PROCEDURE — 77030021907 HC KT URIN FOL O&M -A

## 2019-01-01 PROCEDURE — 88112 CYTOPATH CELL ENHANCE TECH: CPT

## 2019-01-01 PROCEDURE — 86923 COMPATIBILITY TEST ELECTRIC: CPT

## 2019-01-01 PROCEDURE — 88305 TISSUE EXAM BY PATHOLOGIST: CPT

## 2019-01-01 PROCEDURE — 83605 ASSAY OF LACTIC ACID: CPT

## 2019-01-01 PROCEDURE — 36600 WITHDRAWAL OF ARTERIAL BLOOD: CPT

## 2019-01-01 RX ORDER — LANOLIN ALCOHOL/MO/W.PET/CERES
3 CREAM (GRAM) TOPICAL
Status: DISCONTINUED | OUTPATIENT
Start: 2019-01-01 | End: 2019-01-01

## 2019-01-01 RX ORDER — MIDAZOLAM HYDROCHLORIDE 1 MG/ML
3 INJECTION, SOLUTION INTRAMUSCULAR; INTRAVENOUS ONCE
Status: COMPLETED | OUTPATIENT
Start: 2019-01-01 | End: 2019-01-01

## 2019-01-01 RX ORDER — ADHESIVE BANDAGE
30 BANDAGE TOPICAL
Status: DISCONTINUED | OUTPATIENT
Start: 2019-01-01 | End: 2019-01-01 | Stop reason: HOSPADM

## 2019-01-01 RX ORDER — VANCOMYCIN/0.9 % SOD CHLORIDE 1.5G/250ML
1500 PLASTIC BAG, INJECTION (ML) INTRAVENOUS EVERY 24 HOURS
Status: DISCONTINUED | OUTPATIENT
Start: 2019-01-01 | End: 2019-01-01 | Stop reason: HOSPADM

## 2019-01-01 RX ORDER — LANOLIN ALCOHOL/MO/W.PET/CERES
3 CREAM (GRAM) TOPICAL
COMMUNITY

## 2019-01-01 RX ORDER — EPINEPHRINE 0.1 MG/ML
INJECTION INTRACARDIAC; INTRAVENOUS
Status: COMPLETED | OUTPATIENT
Start: 2019-01-01 | End: 2019-01-01

## 2019-01-01 RX ORDER — ESCITALOPRAM OXALATE 20 MG/1
20 TABLET ORAL DAILY
COMMUNITY

## 2019-01-01 RX ORDER — AMIODARONE HYDROCHLORIDE 200 MG/1
200 TABLET ORAL DAILY
Status: DISCONTINUED | OUTPATIENT
Start: 2019-01-01 | End: 2019-01-01 | Stop reason: HOSPADM

## 2019-01-01 RX ORDER — SODIUM CHLORIDE 9 MG/ML
8 INJECTION, SOLUTION INTRAVENOUS
Status: DISCONTINUED | OUTPATIENT
Start: 2019-01-01 | End: 2019-01-01 | Stop reason: HOSPADM

## 2019-01-01 RX ORDER — ADHESIVE BANDAGE
30 BANDAGE TOPICAL
Status: DISCONTINUED | OUTPATIENT
Start: 2019-01-01 | End: 2019-01-01

## 2019-01-01 RX ORDER — EPINEPHRINE 0.1 MG/ML
0.5 INJECTION INTRACARDIAC; INTRAVENOUS ONCE
Status: COMPLETED | OUTPATIENT
Start: 2019-01-01 | End: 2019-01-01

## 2019-01-01 RX ORDER — CHLORHEXIDINE GLUCONATE 1.2 MG/ML
15 RINSE ORAL 2 TIMES DAILY
Status: DISCONTINUED | OUTPATIENT
Start: 2019-01-01 | End: 2019-01-01 | Stop reason: HOSPADM

## 2019-01-01 RX ORDER — ALBUTEROL SULFATE 2.5 MG/.5ML
2.5 SOLUTION RESPIRATORY (INHALATION) 4 TIMES DAILY
COMMUNITY

## 2019-01-01 RX ORDER — BUDESONIDE 0.5 MG/2ML
500 INHALANT ORAL 2 TIMES DAILY
COMMUNITY

## 2019-01-01 RX ORDER — TRAZODONE HYDROCHLORIDE 50 MG/1
100 TABLET ORAL
Status: DISCONTINUED | OUTPATIENT
Start: 2019-01-01 | End: 2019-01-01

## 2019-01-01 RX ORDER — NOREPINEPHRINE BIT/0.9 % NACL 4MG/250ML
2-30 PLASTIC BAG, INJECTION (ML) INTRAVENOUS
Status: DISCONTINUED | OUTPATIENT
Start: 2019-01-01 | End: 2019-01-01

## 2019-01-01 RX ORDER — SODIUM CHLORIDE 0.9 % (FLUSH) 0.9 %
10 SYRINGE (ML) INJECTION
Status: COMPLETED | OUTPATIENT
Start: 2019-01-01 | End: 2019-01-01

## 2019-01-01 RX ORDER — HYDROCODONE BITARTRATE AND ACETAMINOPHEN 7.5; 325 MG/1; MG/1
1 TABLET ORAL
COMMUNITY

## 2019-01-01 RX ORDER — ADHESIVE BANDAGE
30 BANDAGE TOPICAL
COMMUNITY

## 2019-01-01 RX ORDER — ESCITALOPRAM OXALATE 10 MG/1
20 TABLET ORAL DAILY
Status: DISCONTINUED | OUTPATIENT
Start: 2019-01-01 | End: 2019-01-01 | Stop reason: HOSPADM

## 2019-01-01 RX ORDER — DOCUSATE SODIUM 100 MG/1
100 CAPSULE, LIQUID FILLED ORAL 2 TIMES DAILY
COMMUNITY

## 2019-01-01 RX ORDER — MIDAZOLAM HYDROCHLORIDE 1 MG/ML
INJECTION, SOLUTION INTRAMUSCULAR; INTRAVENOUS
Status: COMPLETED
Start: 2019-01-01 | End: 2019-01-01

## 2019-01-01 RX ORDER — RISPERIDONE 0.5 MG/1
TABLET, FILM COATED ORAL 2 TIMES DAILY
COMMUNITY

## 2019-01-01 RX ORDER — AMIODARONE HYDROCHLORIDE 200 MG/1
200 TABLET ORAL DAILY
COMMUNITY

## 2019-01-01 RX ORDER — HYDROCODONE BITARTRATE AND ACETAMINOPHEN 7.5; 325 MG/1; MG/1
1 TABLET ORAL
Status: DISCONTINUED | OUTPATIENT
Start: 2019-01-01 | End: 2019-01-01 | Stop reason: HOSPADM

## 2019-01-01 RX ORDER — ATRACURIUM BESYLATE 10 MG/ML
0.4 INJECTION, SOLUTION INTRAVENOUS ONCE
Status: COMPLETED | OUTPATIENT
Start: 2019-01-01 | End: 2019-01-01

## 2019-01-01 RX ORDER — ATORVASTATIN CALCIUM 10 MG/1
20 TABLET, FILM COATED ORAL
Status: DISCONTINUED | OUTPATIENT
Start: 2019-01-01 | End: 2019-01-01 | Stop reason: HOSPADM

## 2019-01-01 RX ORDER — AMIODARONE HYDROCHLORIDE 200 MG/1
200 TABLET ORAL DAILY
Status: DISCONTINUED | OUTPATIENT
Start: 2019-01-01 | End: 2019-01-01

## 2019-01-01 RX ORDER — IRON POLYSACCHARIDE COMPLEX 150 MG
150 CAPSULE ORAL DAILY
COMMUNITY

## 2019-01-01 RX ORDER — ALBUMIN HUMAN 250 G/1000ML
25 SOLUTION INTRAVENOUS ONCE
Status: COMPLETED | OUTPATIENT
Start: 2019-01-01 | End: 2019-01-01

## 2019-01-01 RX ORDER — LANOLIN ALCOHOL/MO/W.PET/CERES
325 CREAM (GRAM) TOPICAL DAILY
Status: DISCONTINUED | OUTPATIENT
Start: 2019-01-01 | End: 2019-01-01

## 2019-01-01 RX ORDER — ALBUTEROL SULFATE 2.5 MG/.5ML
2.5 SOLUTION RESPIRATORY (INHALATION) 4 TIMES DAILY
Status: DISCONTINUED | OUTPATIENT
Start: 2019-01-01 | End: 2019-01-01

## 2019-01-01 RX ORDER — TRAZODONE HYDROCHLORIDE 100 MG/1
100 TABLET ORAL
Status: ON HOLD | COMMUNITY
End: 2019-01-01 | Stop reason: ALTCHOICE

## 2019-01-01 RX ORDER — SODIUM CHLORIDE 9 MG/ML
8 INJECTION, SOLUTION INTRAVENOUS
Status: DISCONTINUED | OUTPATIENT
Start: 2019-01-01 | End: 2019-01-01

## 2019-01-01 RX ORDER — FENTANYL CITRATE-0.9 % NACL/PF 25 MCG/ML
0-200 PLASTIC BAG, INJECTION (ML) INJECTION
Status: DISCONTINUED | OUTPATIENT
Start: 2019-01-01 | End: 2019-01-01 | Stop reason: HOSPADM

## 2019-01-01 RX ORDER — NOREPINEPHRINE BIT/0.9 % NACL 4MG/250ML
PLASTIC BAG, INJECTION (ML) INTRAVENOUS
Status: COMPLETED
Start: 2019-01-01 | End: 2019-01-01

## 2019-01-01 RX ORDER — TRAZODONE HYDROCHLORIDE 50 MG/1
100 TABLET ORAL
Status: DISCONTINUED | OUTPATIENT
Start: 2019-01-01 | End: 2019-01-01 | Stop reason: HOSPADM

## 2019-01-01 RX ORDER — BUDESONIDE 0.5 MG/2ML
500 INHALANT ORAL
Status: DISCONTINUED | OUTPATIENT
Start: 2019-01-01 | End: 2019-01-01

## 2019-01-01 RX ORDER — ATORVASTATIN CALCIUM 20 MG/1
20 TABLET, FILM COATED ORAL
COMMUNITY

## 2019-01-01 RX ORDER — ATRACURIUM BESYLATE 10 MG/ML
INJECTION, SOLUTION INTRAVENOUS
Status: COMPLETED
Start: 2019-01-01 | End: 2019-01-01

## 2019-01-01 RX ORDER — FAMOTIDINE 20 MG/1
20 TABLET, FILM COATED ORAL 2 TIMES DAILY
COMMUNITY

## 2019-01-01 RX ORDER — SODIUM CHLORIDE 0.9 % (FLUSH) 0.9 %
5-40 SYRINGE (ML) INJECTION AS NEEDED
Status: DISCONTINUED | OUTPATIENT
Start: 2019-01-01 | End: 2019-01-01 | Stop reason: HOSPADM

## 2019-01-01 RX ORDER — DOCUSATE SODIUM 50 MG/5ML
100 LIQUID ORAL EVERY 12 HOURS
Status: DISCONTINUED | OUTPATIENT
Start: 2019-01-01 | End: 2019-01-01 | Stop reason: HOSPADM

## 2019-01-01 RX ORDER — ATRACURIUM BESYLATE 10 MG/ML
70 INJECTION, SOLUTION INTRAVENOUS ONCE
Status: COMPLETED | OUTPATIENT
Start: 2019-01-01 | End: 2019-01-01

## 2019-01-01 RX ORDER — DOCUSATE SODIUM 100 MG/1
100 CAPSULE, LIQUID FILLED ORAL 2 TIMES DAILY
Status: DISCONTINUED | OUTPATIENT
Start: 2019-01-01 | End: 2019-01-01

## 2019-01-01 RX ORDER — HYDROCODONE BITARTRATE AND ACETAMINOPHEN 7.5; 325 MG/1; MG/1
1 TABLET ORAL
Status: DISCONTINUED | OUTPATIENT
Start: 2019-01-01 | End: 2019-01-01

## 2019-01-01 RX ORDER — RISPERIDONE 1 MG/1
0.5 TABLET, FILM COATED ORAL 2 TIMES DAILY
Status: DISCONTINUED | OUTPATIENT
Start: 2019-01-01 | End: 2019-01-01 | Stop reason: HOSPADM

## 2019-01-01 RX ORDER — SODIUM CHLORIDE 0.9 % (FLUSH) 0.9 %
5-40 SYRINGE (ML) INJECTION EVERY 8 HOURS
Status: DISCONTINUED | OUTPATIENT
Start: 2019-01-01 | End: 2019-01-01 | Stop reason: HOSPADM

## 2019-01-01 RX ORDER — ATORVASTATIN CALCIUM 10 MG/1
20 TABLET, FILM COATED ORAL
Status: DISCONTINUED | OUTPATIENT
Start: 2019-01-01 | End: 2019-01-01

## 2019-01-01 RX ORDER — CHLORHEXIDINE GLUCONATE 1.2 MG/ML
15 RINSE ORAL 2 TIMES DAILY
Status: DISCONTINUED | OUTPATIENT
Start: 2019-01-01 | End: 2019-01-01

## 2019-01-01 RX ORDER — VANCOMYCIN 2 GRAM/500 ML IN 0.9 % SODIUM CHLORIDE INTRAVENOUS
2000 ONCE
Status: DISCONTINUED | OUTPATIENT
Start: 2019-01-01 | End: 2019-01-01

## 2019-01-01 RX ORDER — HEPARIN SODIUM 5000 [USP'U]/ML
5000 INJECTION, SOLUTION INTRAVENOUS; SUBCUTANEOUS EVERY 8 HOURS
Status: DISCONTINUED | OUTPATIENT
Start: 2019-01-01 | End: 2019-01-01 | Stop reason: HOSPADM

## 2019-01-01 RX ORDER — CHLORHEXIDINE GLUCONATE 1.2 MG/ML
15 RINSE ORAL 2 TIMES DAILY
COMMUNITY

## 2019-01-01 RX ORDER — HEPARIN SODIUM 5000 [USP'U]/ML
5000 INJECTION, SOLUTION INTRAVENOUS; SUBCUTANEOUS EVERY 8 HOURS
COMMUNITY

## 2019-01-01 RX ORDER — FAMOTIDINE 20 MG/1
20 TABLET, FILM COATED ORAL 2 TIMES DAILY
Status: DISCONTINUED | OUTPATIENT
Start: 2019-01-01 | End: 2019-01-01

## 2019-01-01 RX ORDER — RISPERIDONE 1 MG/1
0.5 TABLET, FILM COATED ORAL 2 TIMES DAILY
Status: DISCONTINUED | OUTPATIENT
Start: 2019-01-01 | End: 2019-01-01

## 2019-01-01 RX ORDER — SODIUM BICARBONATE 1 MEQ/ML
50 SYRINGE (ML) INTRAVENOUS ONCE
Status: COMPLETED | OUTPATIENT
Start: 2019-01-01 | End: 2019-01-01

## 2019-01-01 RX ORDER — FENTANYL CITRATE-0.9 % NACL/PF 25 MCG/ML
0-200 PLASTIC BAG, INJECTION (ML) INJECTION
Status: DISCONTINUED | OUTPATIENT
Start: 2019-01-01 | End: 2019-01-01

## 2019-01-01 RX ORDER — PIPERACILLIN SODIUM, TAZOBACTAM SODIUM 3; .375 G/15ML; G/15ML
3.38 INJECTION, POWDER, LYOPHILIZED, FOR SOLUTION INTRAVENOUS EVERY 6 HOURS
COMMUNITY

## 2019-01-01 RX ORDER — ESCITALOPRAM OXALATE 10 MG/1
20 TABLET ORAL DAILY
Status: DISCONTINUED | OUTPATIENT
Start: 2019-01-01 | End: 2019-01-01

## 2019-01-01 RX ORDER — MORPHINE SULFATE 2 MG/ML
1 INJECTION, SOLUTION INTRAMUSCULAR; INTRAVENOUS
COMMUNITY

## 2019-01-01 RX ORDER — MORPHINE SULFATE 2 MG/ML
1 INJECTION, SOLUTION INTRAMUSCULAR; INTRAVENOUS
Status: DISCONTINUED | OUTPATIENT
Start: 2019-01-01 | End: 2019-01-01 | Stop reason: HOSPADM

## 2019-01-01 RX ADMIN — ALBUTEROL SULFATE 2.5 MG: 2.5 SOLUTION RESPIRATORY (INHALATION) at 12:24

## 2019-01-01 RX ADMIN — BUDESONIDE 500 MCG: 0.5 INHALANT RESPIRATORY (INHALATION) at 07:24

## 2019-01-01 RX ADMIN — VASOPRESSIN 0.03 UNITS/MIN: 20 INJECTION INTRAVENOUS at 17:55

## 2019-01-01 RX ADMIN — ATRACURIUM BESYLATE 70 MG: 10 INJECTION, SOLUTION INTRAVENOUS at 17:40

## 2019-01-01 RX ADMIN — Medication 10 ML: at 15:32

## 2019-01-01 RX ADMIN — SODIUM CHLORIDE 100 ML: 900 INJECTION, SOLUTION INTRAVENOUS at 15:32

## 2019-01-01 RX ADMIN — HEPARIN SODIUM 5000 UNITS: 5000 INJECTION INTRAVENOUS; SUBCUTANEOUS at 21:17

## 2019-01-01 RX ADMIN — PIPERACILLIN SODIUM,TAZOBACTAM SODIUM 4.5 G: 4; .5 INJECTION, POWDER, FOR SOLUTION INTRAVENOUS at 11:46

## 2019-01-01 RX ADMIN — SODIUM CHLORIDE 1000 ML: 900 INJECTION, SOLUTION INTRAVENOUS at 16:56

## 2019-01-01 RX ADMIN — MINERAL OIL AND WHITE PETROLATUM: 150; 830 OINTMENT OPHTHALMIC at 23:10

## 2019-01-01 RX ADMIN — ATRACURIUM BESYLATE 5 MCG/KG/MIN: 10 INJECTION, SOLUTION INTRAVENOUS at 13:50

## 2019-01-01 RX ADMIN — MINERAL OIL AND WHITE PETROLATUM: 150; 830 OINTMENT OPHTHALMIC at 18:43

## 2019-01-01 RX ADMIN — Medication 25 MCG/HR: at 17:29

## 2019-01-01 RX ADMIN — ATRACURIUM BESYLATE 70 MG: 10 INJECTION, SOLUTION INTRAVENOUS at 16:38

## 2019-01-01 RX ADMIN — MIDAZOLAM HYDROCHLORIDE 3 MG: 1 INJECTION, SOLUTION INTRAMUSCULAR; INTRAVENOUS at 16:38

## 2019-01-01 RX ADMIN — HEPARIN SODIUM 5000 UNITS: 5000 INJECTION INTRAVENOUS; SUBCUTANEOUS at 05:30

## 2019-01-01 RX ADMIN — VASOPRESSIN 0.04 UNITS/MIN: 20 INJECTION INTRAVENOUS at 01:20

## 2019-01-01 RX ADMIN — EPINEPHRINE 0.5 MG: 0.1 INJECTION, SOLUTION ENDOTRACHEAL; INTRACARDIAC; INTRAVENOUS at 18:16

## 2019-01-01 RX ADMIN — ALBUTEROL SULFATE 2.5 MG: 2.5 SOLUTION RESPIRATORY (INHALATION) at 17:20

## 2019-01-01 RX ADMIN — FAMOTIDINE 20 MG: 10 INJECTION, SOLUTION INTRAVENOUS at 08:45

## 2019-01-01 RX ADMIN — NOREPINEPHRINE BITARTRATE 30 MCG/MIN: 1 INJECTION INTRAVENOUS at 07:04

## 2019-01-01 RX ADMIN — NOREPINEPHRINE BITARTRATE 30 MCG/MIN: 1 INJECTION INTRAVENOUS at 20:12

## 2019-01-01 RX ADMIN — NOREPINEPHRINE BITARTRATE 16 MCG/MIN: 1 INJECTION INTRAVENOUS at 22:21

## 2019-01-01 RX ADMIN — SODIUM CHLORIDE 5.2 ML/HR: 900 INJECTION, SOLUTION INTRAVENOUS at 17:47

## 2019-01-01 RX ADMIN — PIPERACILLIN SODIUM,TAZOBACTAM SODIUM 4.5 G: 4; .5 INJECTION, POWDER, FOR SOLUTION INTRAVENOUS at 03:19

## 2019-01-01 RX ADMIN — ALBUTEROL SULFATE 2.5 MG: 2.5 SOLUTION RESPIRATORY (INHALATION) at 07:24

## 2019-01-01 RX ADMIN — MINERAL OIL AND WHITE PETROLATUM: 150; 830 OINTMENT OPHTHALMIC at 20:38

## 2019-01-01 RX ADMIN — MINERAL OIL AND WHITE PETROLATUM: 150; 830 OINTMENT OPHTHALMIC at 03:20

## 2019-01-01 RX ADMIN — Medication 10 ML: at 22:36

## 2019-01-01 RX ADMIN — PIPERACILLIN SODIUM,TAZOBACTAM SODIUM 4.5 G: 4; .5 INJECTION, POWDER, FOR SOLUTION INTRAVENOUS at 20:13

## 2019-01-01 RX ADMIN — Medication 50 MEQ: at 20:22

## 2019-01-01 RX ADMIN — NOREPINEPHRINE BITARTRATE 16 MCG/MIN: 1 INJECTION INTRAVENOUS at 17:45

## 2019-01-01 RX ADMIN — EPINEPHRINE 1 MG: 0.1 INJECTION, SOLUTION ENDOTRACHEAL; INTRACARDIAC; INTRAVENOUS at 17:35

## 2019-01-01 RX ADMIN — Medication 10 ML: at 05:30

## 2019-01-01 RX ADMIN — EPINEPHRINE 8 MCG/MIN: 1 INJECTION INTRAMUSCULAR; INTRAVENOUS; SUBCUTANEOUS at 01:18

## 2019-01-01 RX ADMIN — IOPAMIDOL 100 ML: 755 INJECTION, SOLUTION INTRAVENOUS at 15:32

## 2019-01-01 RX ADMIN — EPOPROSTENOL SODIUM 20 NG/KG/MIN: 1.5 INJECTION, POWDER, LYOPHILIZED, FOR SOLUTION INTRAVENOUS at 17:46

## 2019-01-01 RX ADMIN — EPOPROSTENOL SODIUM 20 NG/KG/MIN: 1.5 INJECTION, POWDER, LYOPHILIZED, FOR SOLUTION INTRAVENOUS at 08:33

## 2019-01-01 RX ADMIN — SODIUM CHLORIDE 1000 ML: 900 INJECTION, SOLUTION INTRAVENOUS at 18:01

## 2019-01-01 RX ADMIN — VANCOMYCIN HYDROCHLORIDE 1000 MG: 1 INJECTION, POWDER, LYOPHILIZED, FOR SOLUTION INTRAVENOUS at 20:16

## 2019-01-01 RX ADMIN — ALBUMIN (HUMAN) 25 G: 0.25 INJECTION, SOLUTION INTRAVENOUS at 21:16

## 2019-01-01 RX ADMIN — Medication 5 ML: at 16:00

## 2019-01-01 RX ADMIN — ATRACURIUM BESYLATE 36.6 MG: 10 INJECTION, SOLUTION INTRAVENOUS at 13:45

## 2019-01-01 RX ADMIN — MIDAZOLAM 1 MG/HR: 5 INJECTION INTRAMUSCULAR; INTRAVENOUS at 17:29

## 2019-01-01 RX ADMIN — MINERAL OIL AND WHITE PETROLATUM: 150; 830 OINTMENT OPHTHALMIC at 08:44

## 2019-01-01 RX ADMIN — EPOPROSTENOL SODIUM 30 NG/KG/MIN: 1.5 INJECTION, POWDER, LYOPHILIZED, FOR SOLUTION INTRAVENOUS at 23:51

## 2019-01-01 RX ADMIN — ATRACURIUM BESYLATE 5 MCG/KG/MIN: 10 INJECTION, SOLUTION INTRAVENOUS at 17:31

## 2019-01-01 RX ADMIN — EPINEPHRINE 10 MCG/MIN: 1 INJECTION INTRAMUSCULAR; INTRAVENOUS; SUBCUTANEOUS at 18:39

## 2019-01-17 PROBLEM — I46.9 CARDIAC ARREST (HCC): Status: ACTIVE | Noted: 2019-01-01

## 2019-01-17 PROBLEM — I95.9 HYPOTENSION: Status: ACTIVE | Noted: 2019-01-01

## 2019-01-17 PROBLEM — J96.01 ACUTE RESPIRATORY FAILURE WITH HYPOXIA (HCC): Status: ACTIVE | Noted: 2019-01-01

## 2019-01-17 PROBLEM — J44.9 COPD (CHRONIC OBSTRUCTIVE PULMONARY DISEASE) (HCC): Chronic | Status: ACTIVE | Noted: 2019-01-01

## 2019-01-17 PROBLEM — Z86.79 HISTORY OF HYPERTENSION: Chronic | Status: ACTIVE | Noted: 2019-01-01

## 2019-01-17 PROBLEM — F41.9 ANXIETY: Status: ACTIVE | Noted: 2019-01-01

## 2019-01-17 PROBLEM — N17.9 AKI (ACUTE KIDNEY INJURY) (HCC): Status: ACTIVE | Noted: 2019-01-01

## 2019-01-17 PROBLEM — I25.10 CAD (CORONARY ARTERY DISEASE): Chronic | Status: ACTIVE | Noted: 2019-01-01

## 2019-01-17 PROBLEM — D64.9 ANEMIA: Status: ACTIVE | Noted: 2019-01-01

## 2019-01-17 PROBLEM — J96.00 ACUTE RESPIRATORY FAILURE (HCC): Status: ACTIVE | Noted: 2019-01-01

## 2019-01-17 PROBLEM — J86.9 EMPYEMA LUNG (HCC): Status: ACTIVE | Noted: 2019-01-01

## 2019-01-17 PROBLEM — I73.9 PVD (PERIPHERAL VASCULAR DISEASE) (HCC): Chronic | Status: ACTIVE | Noted: 2019-01-01

## 2019-01-17 PROBLEM — E78.00 HYPERCHOLESTEREMIA: Chronic | Status: ACTIVE | Noted: 2019-01-01

## 2019-01-17 NOTE — PROGRESS NOTES
Patient is on levophed gtt from [facility] AT [facility] for hypotension. Awaiting gtt with our barcode from Pharmacy. Patient's o2 satb in 62s. Versed followed by Tracrium given IVP per Dr. Antoine West. Patient remaining with sats in 62s. Patient's vent being adjusted and monitored closely by RT.

## 2019-01-17 NOTE — H&P
HISTORY AND PHYSICAL Yulia He 1/17/2019 Date of Admission:  (Not on file) The patient's chart is reviewed and the patient is discussed with the staff. Subjective:  
 
Patient is a 70 y.o.  male is being transferred to the ICU from [facility] CARDIOVASC[facility] with acute respiratory failure with significant oxygen needs, MRSA empyema and hemodynamic instability requiring pressor support. He was transferred to [facility] on Dec 26th 2018 from [facility] following CABG and AVR. He is from [address] [place] [address] and was visiting [place] when he developed chest discomfort. He was evaluated and found to have multivessel CAD with aortic valve disease. He underwent 3 V CABG and aortic valve replacement surgery on December 7th. Postoperatively, he had problems with prolonged respiratory failure requiring ventilator support and eventual trach and PEG, delirium, bilateral pneumothoraces requiring bilateral chest tube insertion and a fib. Since being hospitalized at [facility], the chest tubes have been removed. He intially tolerated some optiflow trials but his oxygen needs were noted to be significant. CT scan of the chest revealed bilateral pleural effusions. He underwent a left thoracentesis on 1/14 and culture has reported MRSA. He was started on Vancomycin on 1/15. He has become more unstable and is now requiring full vent support with significant oxygen support. He underwent an emergent left thoracentesis today due to worsening hypoxia - 1350 mls of purulent fluid was removed. He developed atrial fibrillation last night but he rate is controlled. He has not been anticoagulated due to procedures. He has had ongoing problems with anxiety due to acute illness. Ativan appeared to cause more confusion but he has responded to Risperdal. 
   He is anemic and underwent a 1 unit blood transfusion on 1/16.    He has developed LUAN (? ATN now) thought to be secondary to possible volume depletion versus sepsis. Antibiotic therapy was broadened today (addition of Zosyn) when he became more hypotensive. Blood cultures have been obtained. Nephrology is following. Review of Systems - prior to hospitalization: A comprehensive review of systems was negative except for: Constitutional: positive for none Eyes: positive for contacts/glasses Ears, nose, mouth, throat, and face: positive for none Respiratory: positive for negative Cardiovascular: positive for chest pain Gastrointestinal: positive for none Genitourinary: positive for none Integument/breast: positive for none Hematologic/lymphatic: positive for none Musculoskeletal: positive for none Neurological: positive for none Behvioral/Psych: positive for none Endocrine: positive for none Allergic/Immunologic: positive for none Patient Active Problem List  
Diagnosis Code  Empy[facility] lung ([facility] J86.9  Acute respiratory failure with hypoxia ([facility]) J96.01  
 LUAN (acute kidney injury) ([facility] N17.9  Hypotension I95.9  History of hypertension Z86.79  
 Hypercholesteremia E78.00  CAD (coronary artery disease) I25.10  COPD (chronic obstructive pulmonary disease) ([facility]) J44.9  PVD (peripheral vascular disease) ([facility]) I73.9  Anxiety F41.9  Anemia D64.9 Cannot display prior to admission medications because the patient has not been admitted in this contact. No past medical history on file. Past Surgical History:  
Procedure Laterality Date  HX AAA REPAIR    
 HX AORTIC VALVE REPLACEMENT  12/07/2018  
 25 mm pericardial valve - [facility]  HX CORONARY ARTERY BYPASS GRAFT  12/07/2018  
 3 vessel - left internal mammary artery to LAD and reverse saphenous vein graft to the obtuse marginal and 1st diagonal - [facility]  HX VASCULAR STENT Social History Socioeconomic History  Marital status:  Spouse name: Not on file  Number of children: Not on file  Years of education: Not on file  Highest education level: Not on file Social Needs  Financial resource strain: Not on file  Food insecurity - worry: Not on file  Food insecurity - inability: Not on file  Transportation needs - medical: Not on file  Transportation needs - non-medical: Not on file Occupational History  Not on file Tobacco Use  Smoking status: Not on file Substance and Sexual Activity  Alcohol use: Not on file  Drug use: Not on file  Sexual activity: Not on file Other Topics Concern  Not on file Social History Narrative  Not on file No family history on file. Not on File No current facility-administered medications for this encounter. Current Outpatient Medications Medication Sig  piperacillin-tazobactam (ZOSYN) 3.375 gram injection 3.375 g by IntraVENous route every six (6) hours.  vancomycin 1,000 mg 1,000 mg IVPB 1,250 mg by IntraVENous route once.  morphine 2 mg/mL injection 1 mg by IntraVENous route every three (3) hours as needed.  risperiDONE (RISPERDAL) 0.5 mg tablet Take  by mouth two (2) times a day.  traZODone (DESYREL) 100 mg tablet Take 100 mg by mouth nightly as needed for Sleep.  HYDROcodone-acetaminophen (NORCO) 7.5-325 mg per tablet Take 1 Tab by mouth every six (6) hours as needed for Pain.  amiodarone (CORDARONE) 200 mg tablet Take 200 mg by mouth daily.  amino acids-protein hydrolys (PRO-STAT AWC)  gram-kcal/30 mL liqd Take 30 mL by mouth daily.  magnesium hydroxide (GARCIA MILK OF MAGNESIA) 400 mg/5 mL suspension Take 30 mL by mouth every four (4) hours as needed for Constipation.  escitalopram oxalate (LEXAPRO) 20 mg tablet Take 20 mg by mouth daily.  chlorhexidine (PERIDEX) 0.12 % solution 15 mL by Swish and Spit route two (2) times a day.  budesonide (PULMICORT) 0.5 mg/2 mL nbsp 500 mcg by Nebulization route two (2) times a day.  albuterol sulfate (PROVENTIL;VENTOLIN) 2.5 mg/0.5 mL nebu nebulizer solution 2.5 mg by Nebulization route four (4) times daily.  docusate sodium (COLACE) 100 mg capsule Take 100 mg by mouth two (2) times a day.  famotidine (PEPCID) 20 mg tablet Take 20 mg by mouth two (2) times a day.  melatonin 3 mg tablet Take 3 mg by mouth nightly.  iron polysaccharides (NIFEREX) 150 mg iron capsule Take 150 mg by mouth daily.  heparin sodium,porcine (HEPARIN, PORCINE,) 5,000 unit/mL syrg 5,000 Units by Injection route every eight (8) hours.  atorvastatin (LIPITOR) 20 mg tablet Take 20 mg by mouth nightly. Objective:  
pending PHYSICAL EXAM  
 
Constitutional:  the patient is well developed and in mild respiratory distress HEENT:  Sclera clear, pupils equal, oral mucosa moist 
Respiratory: slightly labored, on full vent support. Has cuffed trach in place. Bilateral breath sounds, clear Cardiovascular:  Irregular and without M,G,R. A fib per telemetry Abd/GI: soft and non-tender; with positive bowel sounds. Ext: warm without cyanosis. There is no lower leg edema. Skin:  no jaundice or rashes, sacral and gluteal wounds Neuro: awake and alert when not sedated, anxious at times Musculoskeletal: moves all four extremities with equal strength. No deformities Chest Xray: pending Recent Labs  
  01/17/19 
0202 01/16/19 
1108 WBC 8.5 6.1 HGB 8.9* 7.3* HCT 28.4* 24.2*  
 322 Recent Labs  
  01/17/19 
0202 01/16/19 
1108 * 132*  
K 5.1 4.3 CL 94* 89* * 190* CO2 33* 37* * 104* CREA 1.88* 1.78* CA 8.0* 8.0* Assessment:  (Medical Decision Making) Hospital Problems  Never Reviewed Codes Class Noted POA Empyema lung ([facility] ICD-10-CM: J86.9 ICD-9-CM: 510.9  1/17/2019 Yes Acute respiratory failure with hypoxia ([facility] ICD-10-CM: J96.01 
ICD-9-CM: 518.81  1/17/2019 Yes LUAN (acute kidney injury) ([facility] ICD-10-CM: N17.9 ICD-9-CM: 584.9  1/17/2019 Yes Hypotension ICD-10-CM: I95.9 ICD-9-CM: 458.9  1/17/2019 Yes History of hypertension (Chronic) ICD-10-CM: Z86.79 
ICD-9-CM: V12.59  1/17/2019 Yes COPD (chronic obstructive pulmonary disease) (HCC) (Chronic) ICD-10-CM: J44.9 ICD-9-CM: 834  1/17/2019 Yes Anxiety ICD-10-CM: F41.9 ICD-9-CM: 300.00  1/17/2019 Yes Anemia ICD-10-CM: D64.9 ICD-9-CM: 285.9  1/17/2019 Yes Plan:  (Medical Decision Making) 1. Transfer to ICU at [facility] due to instability 2. Follow up CXR. Repeat thoracentesis, consider CT insertion as needed 3. Continue vanc and zosyn. Blood cultures drawn today 4. IV fluids, pressor support 5. Daily labs. Monitor renal function. Nephrology following 6. TF support for moderate malnutrition Lauren Mantle, NP Lungs: decreased breath sounds on the left Heart:  RRR with no Murmur/Rubs/Gallops Additional Comments:   Critically ill - sedate and paralyze, add flolan, abg 
 
I have spoken with and examined the patient. I agree with the above assessment and plan as documented. Rebecca Villalobos MD 
 
 
More than 50% of time documented was spent face-to-face contact with the patient and in the care of the patient on the floor/unit where the patient is located

## 2019-01-17 NOTE — PROGRESS NOTES
Patient received from [facility] on vent. Patient is unresponsive to pain, and has no gag reflex. Patient's 02 sat 71% and patient is hypotensive. Patient transferred to CCU bed and placed on CCU monitors. Patient is agonally breathing. MD at bedside.

## 2019-01-17 NOTE — PROGRESS NOTES
Code Blue Responded to code blue. Pt transferred from [facility] for further management of MRSA empyema/sepsis. Pt received CPR and Epi x 1 with ROSC. Pt severely hypoxic and Flolan just arrived. Pt had received Tracrium x 1 with improvement in sat. Pt with ongoing hypotension and hypoxemia. Pressors increased and vent adjusted to 2:1 with PCV. ABG with severe acidosis and pt given Bicarb x 1. Due to ongoing hypotension, arterial line attempted via R femoral artery. On third stick, brighter blood recovered and wire advanced and line placed. BPs running about 25/20. Line and set up repeatedly checked with no change. Arterial line then placed in L Femoral artery. Pulsatile blood flow noted and line advanced. Again initially unable to get flow. After considerable manipulation eventually able to get arterial pulse and flow. Initial line removed and considerable bleeding evident of bright red blood suggesting line was actually in artery. Critically ill. 75 minutes at bedside.  
 
Brigida Felty, MD

## 2019-01-17 NOTE — PROGRESS NOTES
Patient lost a pulse and went asystole on monitor. CPR initiated and one dose of epi IVP given. Pulse and heart rhythm regained. Patient is sinus tach at 116. Dr. Diana Drummond at bedside placing an ART line. Patient is currently on fentanyl, versed, tracrium, levophed, and vasopressin gtts as well as a NS bolus.

## 2019-01-17 NOTE — PROGRESS NOTES
Pt admitted from [facility]. [employer] [facility] insurance and will need precert to return to [place]. CM to follow.

## 2019-01-17 NOTE — PROGRESS NOTES
Arrived to CCU from [place]. Pt sats <75%, placed on PC of 16 with an iT of 2.20 for a 2:1 I:E ratio. Set rr of 18 at 100% and 12 of PEEP. Assisted in [facility] placement and increased PEEP to 16 per Dr. Davey Screen during procedure with O2 sats <65%. Attempted APRV for better oxygenation but paralyzing agent administered by RN, so change back to [facility] was made.

## 2019-01-17 NOTE — PROGRESS NOTES
Response to code blue. Pt survived code but is not expected to recover. Prayed with pt, who is very weak, prayed with wife. Pt & wife live in [place], [place]; pt's wife has no support system to help her, outside the hospital. 
 
Assured pt's wife of our care for her, as well as for her , and of our intention to support her, as well as him, emotionally & spiritually.  
 
 
marissa Stallings, MDiv,ThM,PhD

## 2019-01-17 NOTE — PROCEDURES
The pt was placed in the supine position The left neck was prepped and drapped Using ultrasound the  Left  ij accessed A quad lumen was placed using Seldinger technique - good blood return.

## 2019-01-18 PROBLEM — B95.62 BACTEREMIA DUE TO METHICILLIN RESISTANT STAPHYLOCOCCUS AUREUS: Status: ACTIVE | Noted: 2019-01-01

## 2019-01-18 PROBLEM — A41.9 SEVERE SEPSIS WITH SEPTIC SHOCK (HCC): Status: ACTIVE | Noted: 2019-01-01

## 2019-01-18 PROBLEM — R65.21 SEVERE SEPSIS WITH SEPTIC SHOCK (HCC): Status: ACTIVE | Noted: 2019-01-01

## 2019-01-18 PROBLEM — R78.81 BACTEREMIA DUE TO METHICILLIN RESISTANT STAPHYLOCOCCUS AUREUS: Status: ACTIVE | Noted: 2019-01-01

## 2019-01-18 NOTE — PROGRESS NOTES
Pt remains severely hypotensive on levo, , and epi at max doses. No respiratory distress and sat 91. Spoke with wife that pt is extremely critical and likely not to survive the night. Wife does not want CPR in the event of arrest as she says the patient would not want it. DNR established. Awaiting labs post arterial line placement. Remains acidotic. Will give bicarb and albumin and monitor response. Px appears grim.  
 
Jaylon Lugo MD

## 2019-01-18 NOTE — PROGRESS NOTES
A follow up visit was made to the patient. Emotional support, spiritual presence and  
prayer were provided.  met his wife, his niece and his sister. His family members were all tearful. Bruna Acosta

## 2019-01-18 NOTE — PROGRESS NOTES
Dr. Chan Allison updated on patient condition and current vitals maxed on levo, vaso and epi. No new orders received.

## 2019-01-18 NOTE — PROGRESS NOTES
Ventilator check complete; patient has a #6.0 tracheostomy. Patient is sedated. Patient is not able to follow commands. Breath sounds are coarse. Trachea is midline, Negative for subcutaneous air, and chest excursion is symmetric. Patient is also Negative for cyanosis and is Negative for pitting edema on the right, Positive for pitting edema on the left . All alarms are set and audible. Resuscitation bag is at the head of the bed. Ventilator Settings Mode FIO2 Rate Tidal Volume Pressure PEEP I:E Ratio Pressure control  70 %   20      20 14 cm H20  1:1.49 Peak airway pressure: 35.1 cm H2O Minute ventilation: 17.8 l/min ABG: No results for input(s): PH, PCO2, PO2, HCO3 in the last 72 hours.  
 
 
Kathy Delaney, RT

## 2019-01-18 NOTE — PROGRESS NOTES
Dr. Diana Drummond notified of ABG results and inability to obtain 0-1/4 TOF twitches. Orders received to decrease PEEP to 14, decrease flolan gtt to 20ng/kg/min, and nursing miscellaneous to titrate tracrium based off of pt breathing over the vent. RT decreased fiO2 to 70% s/p ABG.

## 2019-01-18 NOTE — WOUND CARE
Attempted to see pt this morning, primary RN Diana Rooney stated that they may decide to make pt comfort care, and can check back later on pt. Will check back later this afternoon to see pt.

## 2019-01-18 NOTE — PROGRESS NOTES
Bedside, Verbal and Written shift change report given to Zari Worthington RN (oncoming nurse) by Carolin Medrano RN (offgoing nurse). Report included the following information SBAR, Kardex, Intake/Output, MAR, Recent Results, Cardiac Rhythm NSR and Alarm Parameters . Dual skin assessment performed.  Versed and fentanyl gtts dual verified in [facility]

## 2019-01-18 NOTE — PROGRESS NOTES
Leisa Stager Admission Date: 1/17/2019 Daily Progress Note: 1/18/2019 The patient's chart is reviewed and the patient is discussed with the staff. 70yo WM from [address], visiting [place], transferred from [facility] 1/17 for hypotension and worsening hypoxic respiratory failure. He was transferred to [facility] on Dec 26th 2018 from [facility] following CABG and AVR. He was visiting [place] when he developed CP. He found to have mv CAD with AV disease. Had 3V CABG and AV replacement on Dec 7th. Postop, had prolonged vent dependence, trach and PEG, delirium, bilateral PTX and a fib. At [facility], chest tubes out, was down to optiflow trials before O2 needs stefania. CT with B effusions. L thora 1/14 with heavy MRSA. Plan for pigtail drain but on backorder. IR consulted for drain but became too unstable. 1/17 desatted to 70's on 100% FiO2 and hypotensive requiring pressors. Urgent L thoracentesis 1/17 with 1400cc pus to help with sats and moved to ICU. Cardiac arrested 1/17, likely due to hypoxia. Required triple pressors, paralytics and inhaled flolan. Subjective: Today patient is down from 3 pressors to 2. FiO2 has been weaned down to 60%. Never appeared completely paralyzed even on high doses of tracrium. Now being wean. Still on inhaled flolan. Wife in the room, indicating that she may want to make him comfort measures if he does not have drastic improving in the coming days. Already made him DNR. Current Facility-Administered Medications Medication Dose Route Frequency  HYDROcodone-acetaminophen (NORCO) 7.5-325 mg per tablet 1 Tab  1 Tab Per NG tube Q6H PRN  
 magnesium hydroxide (MILK OF MAGNESIA) 400 mg/5 mL oral suspension 30 mL  30 mL Per NG tube Q4H PRN  
 traZODone (DESYREL) tablet 100 mg  100 mg Per NG tube QHS PRN  
 risperiDONE (RisperDAL) tablet 0.5 mg  0.5 mg Per NG tube BID  docusate (COLACE) 50 mg/5 mL oral liquid 100 mg  100 mg Per NG tube Q12H  escitalopram oxalate (LEXAPRO) tablet 20 mg  20 mg Per NG tube DAILY  atorvastatin (LIPITOR) tablet 20 mg  20 mg Per NG tube QHS  amiodarone (CORDARONE) tablet 200 mg  200 mg Per NG tube DAILY  albuterol CONCENTRATE 2.5mg/0.5 mL neb soln  2.5 mg Nebulization QID  budesonide (PULMICORT) 500 mcg/2 ml nebulizer suspension  500 mcg Nebulization BID RT  
 heparin (porcine) injection 5,000 Units  5,000 Units SubCUTAneous Q8H  
 morphine injection 1 mg  1 mg IntraVENous Q3H PRN  
 sodium chloride (NS) flush 5-40 mL  5-40 mL IntraVENous Q8H  
 sodium chloride (NS) flush 5-40 mL  5-40 mL IntraVENous PRN  
 atracurium (TRACRIUM) 1,000 mg in 0.9% sodium chloride 250 mL infusion  0-15 mcg/kg/min IntraVENous TITRATE  
 0.9% sodium chloride infusion for Flolan infusion  8 mL/hr Nebulization TITRATE  epoprostenol (FLOLAN) 30,000 ng/mL in diluent for epoprostenol (gly) 50 mL solution  10-50 ng/kg/min (Order-Specific) Nebulization TITRATE  piperacillin-tazobactam (ZOSYN) 4.5 g in 0.9% sodium chloride (MBP/ADV) 100 mL  4.5 g IntraVENous Q8H  
 vasopressin (VASOSTRICT) 20 Units in 0.9% sodium chloride 100 mL infusion  0-0.04 Units/min IntraVENous TITRATE  midazolam (VERSED) 100 mg in 0.9% sodium chloride 100 mL infusion  0-10 mg/hr IntraVENous TITRATE  fentaNYL in normal saline (pf) 25 mcg/mL infusion  0-200 mcg/hr IntraVENous TITRATE  white petrolatum-mineral oil (AKWA TEARS) 83-15 % ophthalmic ointment   Both Eyes Q4H  
 famotidine (PF) (PEPCID) 20 mg in sodium chloride 0.9% 10 mL injection  20 mg IntraVENous DAILY  Vancomycin Intermittent Dosing per pharmacy    Other Rx Dosing/Monitoring  EPINEPHrine (ADRENALIN) 4 mg in 0.9% sodium chloride 250 mL infusion  1-10 mcg/min IntraVENous TITRATE  
 NOREPINephrine (LEVOPHED) 16 mg in dextrose 5% 250 mL infusion  2-30 mcg/min IntraVENous TITRATE  chlorhexidine (PERIDEX) 0.12 % mouthwash 15 mL  15 mL Swish and Spit BID  
 
 
 Review of Systems Unobtainable due to patient status. Objective:  
 
Vitals:  
 01/18/19 1459 01/18/19 2011 01/18/19 2291 01/18/19 9519 BP:      
Pulse: 91     
Resp: 20 Temp:   98.3 °F (36.8 °C) 98.3 °F (36.8 °C) SpO2: 99% 100% Weight:      
Height:      
 
Intake and Output:  
01/16 1901 - 01/18 0700 In: 1924.6 [I.V.:1924.6] Out: 650 [Urine:650] 01/18 0701 - 01/18 1900 In: 77 [I.V.:66] Out: 325 [Urine:325] Physical Exam:  
Constitution:  the patient is critically ill appearing. EENMT:  Sclera clear, pupils equal, oral mucosa moist 
Respiratory: Trach. Decreased at left base more than right. Cardiovascular:  RRR without M,G,R 
Gastrointestinal: soft and non-tender; with positive bowel sounds. Musculoskeletal: warm without cyanosis. There is no lower leg edema. Skin:  no jaundice or rashes, recent chest tube wounds Neurologic: sedated, on paralytics. Psychiatric:  Sedated, unresponsive CHEST XRAY:  
1/18/19 IMPRESSION: Unchanged bilateral lung edema or infiltrates and small pleural 
Effusions. LAB No lab exists for component: [place] Recent Labs  
  01/18/19 
0241 01/17/19 1933 01/17/19 
1753 01/17/19 
0202 WBC 11.3* 7.9 6.1 8.5 HGB 8.6* 9.4* 9.8* 8.9* HCT 27.7* 31.6* 31.8* 28.4*  
 258 121* 274 Recent Labs  
  01/18/19 
0237 01/17/19 1933 01/17/19 
1753  138 130*  
K 4.2 5.2* 9.6*  
* 104 100 CO2 21 26 29 * 134* 127* BUN 94* 107* 107* CREA 1.56* 1.95* 2.04* MG  --   --  2.7*  
CA 6.1* 6.9* 7.0*  
PHOS  --   --  5.8* ALB 1.3* 1.3*  --   
SGOT 32 27  --   
 
ABG:   
Lab Results Component Value Date/Time PHI 7.383 01/18/2019 02:41 AM  
 PCO2I 29.8 (L) 01/18/2019 02:41 AM  
 PO2I 197 (H) 01/18/2019 02:41 AM  
 HCO3I 17.7 (L) 01/18/2019 02:41 AM  
 FIO2I 100 01/18/2019 02:41 AM  
 
 
 
MICRO Blood - 2/2 MRSA Assessment:  (Medical Decision Making) Hospital Problems  Date Reviewed: 1/18/2019 Codes Class Noted POA Bacteremia due to methicillin resistant Staphylococcus aureus ICD-10-CM: R78.81 ICD-9-CM: 790.7, 041.12  1/18/2019 No  
   
 Severe sepsis with septic shock (HCC) ICD-10-CM: A41.9, R65.21 ICD-9-CM: 038.9, 995.92, 785.52  1/18/2019 Unknown Empyema lung ([facility] ICD-10-CM: J86.9 ICD-9-CM: 510.9  1/17/2019 Yes * (Principal) Acute respiratory failure with hypoxia ([facility] ICD-10-CM: J96.01 
ICD-9-CM: 518.81  1/17/2019 Yes LUAN (acute kidney injury) ([facility] ICD-10-CM: N17.9 ICD-9-CM: 584.9  1/17/2019 Yes Hypotension ICD-10-CM: I95.9 ICD-9-CM: 458.9  1/17/2019 Yes History of hypertension (Chronic) ICD-10-CM: Z86.79 
ICD-9-CM: V12.59  1/17/2019 Yes COPD (chronic obstructive pulmonary disease) (HCC) (Chronic) ICD-10-CM: J44.9 ICD-9-CM: 950  1/17/2019 Yes Anxiety ICD-10-CM: F41.9 ICD-9-CM: 300.00  1/17/2019 Yes Anemia ICD-10-CM: D64.9 ICD-9-CM: 285.9  1/17/2019 Yes Acute respiratory failure ([facility] ICD-10-CM: J96.00 
ICD-9-CM: 518.81  1/17/2019 Unknown Cardiac arrest [facility]) ICD-10-CM: I46.9 ICD-9-CM: 427.5  1/17/2019 No  
   
  
 
 
Patient with MRSA bacteremia, MRSA empyema, critically ill on 2 pressor support. Was on 100% FiO2. Requiring paralytics and inhaled flolan. Discussed with wife poor overall prognosis on top what had already been a prolonged recovery from CABG and AVR. She indicates that he would not want prolonged vent dependence and she may want to compassionately withdraw aggressive support in the coming days if he does not make significant improvements. Plan:  (Medical Decision Making)  
-try to wean off paralytics today.  
-if that does not negatively impact his FiO2 needs will then try to wean flolan followed by PEEP. -monitor L empyema closely. If reaccumulates to a significant size will pursue chest drain placement. -TTE today to look for vegetations.  
-cont vanc and zosyn. -repeat blood cultures in the am.  
-bnp and lactate now.  
-if ongoing bacteremia and wife wants continued aggressive care will consult ID. Patient critically ill. Total critical care time spent thusfar: 45 minutes.   
 
Rigoberto Thayer MD

## 2019-01-18 NOTE — DISCHARGE SUMMARY
Death Summary Dev Mojica Admission date:  1/17/2019 Discharge date:  1/18/2019 Admitting Diagnosis:  Empyema Hypotension Acute respiratory failure ([facility] Discharge Diagnosis:   
Problem List as of 1/18/2019 Date Reviewed: 1/18/2019 Codes Class Noted - Resolved Bacteremia due to methicillin resistant Staphylococcus aureus ICD-10-CM: R78.81 ICD-9-CM: 790.7, 041.12  1/18/2019 - Present Severe sepsis with septic shock (HCC) ICD-10-CM: A41.9, R65.21 ICD-9-CM: 038.9, 995.92, 785.52  1/18/2019 - Present Empyema lung ([facility] ICD-10-CM: J86.9 ICD-9-CM: 510.9  1/17/2019 - Present * (Principal) Acute respiratory failure with hypoxia [facility]) ICD-10-CM: J96.01 
ICD-9-CM: 518.81  1/17/2019 - Present LUAN (acute kidney injury) ([facility] ICD-10-CM: N17.9 ICD-9-CM: 584.9  1/17/2019 - Present Hypotension ICD-10-CM: I95.9 ICD-9-CM: 458.9  1/17/2019 - Present History of hypertension (Chronic) ICD-10-CM: Z86.79 
ICD-9-CM: V12.59  1/17/2019 - Present Hypercholesteremia (Chronic) ICD-10-CM: E78.00 ICD-9-CM: 272.0  1/17/2019 - Present CAD (coronary artery disease) (Chronic) ICD-10-CM: I25.10 ICD-9-CM: 414.00  1/17/2019 - Present COPD (chronic obstructive pulmonary disease) (HCC) (Chronic) ICD-10-CM: J44.9 ICD-9-CM: 229  1/17/2019 - Present PVD (peripheral vascular disease) (HCC) (Chronic) ICD-10-CM: I73.9 ICD-9-CM: 443.9  1/17/2019 - Present Anxiety ICD-10-CM: F41.9 ICD-9-CM: 300.00  1/17/2019 - Present Anemia ICD-10-CM: D64.9 ICD-9-CM: 285.9  1/17/2019 - Present Acute respiratory failure ([address] ICD-10-CM: J96.00 
ICD-9-CM: 518.81  1/17/2019 - Present Cardiac arrest [facility]) ICD-10-CM: I46.9 ICD-9-CM: 427.5  1/17/2019 - Present Consultants: 
Palliative Care Studies/Procedures: 
[employer] CXR 
[facility] course:  70 y.o.   male transferred to the ICU from [facility] with acute respiratory failure, MRSA empyema and hemodynamic instability requiring pressor support. Was transferred to [facility] on Dec 26th 2018 from [facility] following CABG and AVR. He is from [address] [place] [address] and was visiting [place] when he developed chest discomfort, evaluated and found to have multivessel CAD with aortic valve disease. He underwent 3 V CABG and aortic valve replacement surgery on December 7th. Postoperatively, he had problems with prolonged respiratory failure requiring ventilator support and eventual trach and PEG, delirium, bilateral pneumothoraces requiring bilateral chest tube insertion and a fib. Since being hospitalized at [facility], the chest tubes have been removed. He intially tolerated some optiflow trials but his oxygen needs were noted to be significant. CT scan of the chest revealed bilateral pleural effusions. Had a left thoracentesis on 1/14 and culture has reported MRSA. He was started on Vancomycin on 1/15. Has become more unstable and requiring full vent support. He underwent an emergent left thoracentesis today due to worsening hypoxia - 1350 mls of purulent fluid was removed. He developed atrial fibrillation last night but he rate is controlled. He has not been anticoagulated due to procedures. He has had ongoing problems with anxiety due to acute illness. Ativan appeared to cause more confusion but he has responded to Risperdal. 
   He is anemic and underwent a 1 unit blood transfusion on 1/16. He has developed LUAN (? ATN now) thought to be secondary to possible volume depletion versus sepsis. Antibiotic therapy was broadened today (addition of Zosyn) when he became more hypotensive. Blood cultures have been obtained. Nephrology is following. In the CCU central line was placed, antibiotics were continued, given IVFs and pressor support.   Had cardiac arrest with loss of pulse, CPR was performed and received Epinephrine with ROSC. Due to severely poor prognosis his wife changed him to DNR status. Parlaytic and Flolan were added. Was able to weaned paralytics some but with increased work of breathing and wife decided on compassionate care and all interventions were stopped and he . Final: 
--Pronounced dead at 14:01 on 19. --Total discharge greater than 30 minutes in duration. Dank Hutson NP I have spoken with and examined the patient. I agree with the above assessment and plan as documented.  
 
Jey Pate MD

## 2019-01-18 NOTE — PROGRESS NOTES
Interdisciplinary team rounds were held 1/18/2019 with the following team members:Nursing, Nurse Practitioner, Nutrition, Palliative Care, Pastoral Care, Pharmacy, Physical Therapy, Physician, Respiratory Therapy and Clinical Coordinator and the patient and spouse. Plan of care discussed. See clinical pathway and/or care plan for interventions and desired outcomes.

## 2019-01-18 NOTE — PROGRESS NOTES
Patient is now off of paralytic and BIS sustaining in the 40s. Patient had a short period of time when BIS was 85 and when this RN said his name he slightly opened his eyes and tried to focus eyes on me. MD aware.

## 2019-01-18 NOTE — PROGRESS NOTES
Patient having worsening respiratory status on ventilator. Dr. Clare Reza called. Stat ABG drawn. Patient taken off ventilator and bagged through trach due to ventilator asynchrony and worsening hypoxia. Family changed patient to comfort measure and bagging stopped. Family at bedside.

## 2019-01-18 NOTE — PROGRESS NOTES
Called back to see patient emergently for worsening hypoxia. Patient in more respiratory distress, increased WOB. Higher peak pressures on vent. Stat abg and CXR ordered. No significant change on CXR. Concerned that stopping paralytic and lightening sedation causing worsening respiratory status. Turned sedation back up and restarted paralytic. Having to bag patient through trach to maintain sats in low 90's. Patient's wife outside room, updated as to situation. Increasing pressor needs, restarted on epi. After witnessing interventions for a time the wife decided she wanted us to stop all interventions and let him pass. She tearfully took up position at his bedside. Bagging stopped and paralytics, pressors stopped. Comfort measures only. Additional critical care time spent: 40 minutes - total 85 minutes thusfar.   
 
Dixie Denney MD

## 2019-01-18 NOTE — PROGRESS NOTES
Problem: Nutrition Deficit Goal: *Optimize nutritional status Nutrition: BPA for EN/PN PTA. Assessment: Anthropometrics: Ht - 5'9\", wgt - 91.4 kg (CCU bed 1/18/19), BMI 29.7 c/w \"healthy\" weight for a person >72years of age, edema - 2+ generalized. Macronutrient Needs: 
Estimated calorie needs - 8160-9033 helen/day (20-22 helen/kg/day) based on 90 kg wgt Estimated protein needs - 58-87 gm pro/day (0.8-1.2 gm pro/kgIBW/day) (GFR 48 ml/min - LUAN) Max CHO/day - 248 gm CHO/day (50% helen/day) Fluid/day - 1.8-2 liters/day (1 ml/helen/day) Intake/Comparative Standards: The patient is currently NPO which meets 0% of his calorie and 0% of his protein needs. Pertinent Labs/Hemodynamic Parameters: BUN 94, creatinine 1.56; MAP - 66. Pertinent Medications/Drips: 
 Zosyn; Levophed and Vasopressin drips. GI Function/Activity: 
 Distended, semi-soft abdomen. Last bowel movement unknown. Diet: 
 NPO Enteral Access: 
 PEG. Food/Nutrition History: 
 70year old gentleman with a h/o CAD, s/p CABG and extended recovery admitted from [employer] after cardiac arrest. He was maintained on Jevity 1.5 @ 60 ml/hr with a 30 ml/hr water flush (2160 calories/day, 92 gm protein/day, 311 gm CHO/day in 1814 ml water/day) while in [employer]. Wife may opt to change his level of care to comfort care if no improvement is seen in the next few days. Diagnosis (Nutrition): Inadequate energy intake related to NPO status as evidenced by the patient being intubated and ventilated. Intervention: 
Meals and Snacks: NPO. Enteral Nutrition: Once hemodynamically stable, could resume TF with Osmolite 1.2 @ 25 ml/hr with a 25 ml/hr water flush via PEG. Increase TF as tolerated to the goal rate of 65 ml/hr - 1872 calories/day (100% calorie goal), 87 grams protein/day (100% protein goal), 246 grams CHO/day (does not exceed max CHO limit) and 1879 ml water/day (100% fluid goal). Coordination of Nutrition Care by a Nutrition Professional: AM CCU rounds, collaboration with Samy Mahajan, [address] [place] [address]. Nutrition Discharge Plan: Too soon to determine. Mishel Lyman. [facility] 
[phone]

## 2019-01-18 NOTE — CONSULTS
Palliative Care Patient: Nasima Ashtno MRN: [id number]  SSN: [id number] YOB: 1955  Age: 70 y.o. Sex: male Date of Request: 1/18/2019 Date of Consult:  1/18/2019 Reason for Consult:  family support and education and goals of care Requesting Physician: Kylie Looney NP Assessment/Plan:  
 
Principal Diagnosis:   
Altered Mental Status R41.82 Additional Diagnoses: · Acute Respiratory Failure, Unspecified  J96.00 · Debility, Unspecified  R53.81 
· Failure to Thrive  R62.7 · Fatigue, Lethargy  R53.83 
· Frailty  R54 · Counseling, Encounter for Medical Advice  Z71.9 
· Encounter for Palliative Care  Z51.5 Palliative Performance Scale (PPS): PPS: 20 Medical Decision Making:  
Reviewed and summarized notes from admission to present Discussed case with appropriate providers- ICU IDT; Suzanne Ellis Reviewed laboratory and x-ray data from admission to present Pt sedated, ventilated via trach. Wife and other family at bedside. Introduced role of PC and reviewed events. She has great understanding of his condition. Wife tearful throughout the conversation, relaying the difficult journey the pt has had. She has discussed plan of care with Dr Doris Menchaca, and if there are not significant improvements in the next few days, she is considering transitioning to comfort care. She asked how we would keep him comfortable. Reviewed compassionate removal from the ventilator, and medications used for comfort. Wife appreciative of information and care pt has received. Provided support. Discussed with Axel Teran RN. Will continue to follow. Will discuss findings with members of the interdisciplinary team.   
 
Thank you for this referral.    
 
  
. 
 
Subjective:  
 
History obtained from:  Family, Care Provider and Chart Chief Complaint: Cardiac arrest,  Empyema History of Present Illness:  Mr Trace Vargas is a 71 yo  male with a PMH of CAD s/p aortic valve replacement and CABG in December 2018, who was transferred from [facility] on 1/18/2019 due to acute respiratory failure and hemodynamic instability. He originally presented to [facility] on Dec 6, 2018 with chest pain. December 7th. Postoperatively, he had problems with prolonged respiratory failure requiring ventilator support and eventual trach and PEG, delirium, bilateral pneumothoraces requiring bilateral chest tube insertion and a fib. Since being hospitalized at [facility], the chest tubes have been removed. He intially tolerated some optiflow trials but his oxygen needs were noted to be significant. CT scan of the chest revealed bilateral pleural effusions. He underwent a left thoracentesis on 1/14 and culture has reported MRSA. He was started on Vancomycin on 1/15. He became more unstable, requiring full vent support with significant oxygen support. He underwent an emergent left thoracentesis on 1/18 due to worsening hypoxia - 1350 mls of purulent fluid was removed. After transfer to [facility], he became more unstable, and experienced cardiac arrest.  ROSC was obtained, however, pt remains hemodynamically unstable. Advance Directive: No      
Code Status:  DNR Health Care Power of : No - Patient does not have a [address]. No past medical history on file. Past Surgical History:  
Procedure Laterality Date  HX AAA REPAIR    
 HX AORTIC VALVE REPLACEMENT  12/07/2018  
 25 mm pericardial valve - [facility]  HX CORONARY ARTERY BYPASS GRAFT  12/07/2018  
 3 vessel - left internal mammary artery to LAD and reverse saphenous vein graft to the obtuse marginal and 1st diagonal - [facility]  HX VASCULAR STENT No family history on file. Social History Tobacco Use  Smoking status: Not on file Substance Use Topics  Alcohol use: Not on file Prior to Admission medications Medication Sig Start Date End Date Taking? Authorizing Provider  
vancomycin 1,000 mg 1,000 mg IVPB 1,250 mg by IntraVENous route once. Yes Provider, Historical  
piperacillin-tazobactam (ZOSYN) 3.375 gram injection 3.375 g by IntraVENous route every six (6) hours. Provider, Historical  
morphine 2 mg/mL injection 1 mg by IntraVENous route every three (3) hours as needed. Provider, Historical  
risperiDONE (RISPERDAL) 0.5 mg tablet Take  by mouth two (2) times a day. Provider, Historical  
HYDROcodone-acetaminophen (NORCO) 7.5-325 mg per tablet Take 1 Tab by mouth every six (6) hours as needed for Pain. Provider, Historical  
amiodarone (CORDARONE) 200 mg tablet Take 200 mg by mouth daily. Provider, Historical  
amino acids-protein hydrolys (PRO-STAT AWC)  gram-kcal/30 mL liqd Take 30 mL by mouth daily. Provider, Historical  
magnesium hydroxide ([employer] MILK OF MAGNESIA) 400 mg/5 mL suspension Take 30 mL by mouth every four (4) hours as needed for Constipation. Provider, Historical  
escitalopram oxalate (LEXAPRO) 20 mg tablet Take 20 mg by mouth daily. Provider, Historical  
chlorhexidine (PERIDEX) 0.12 % solution 15 mL by Swish and Spit route two (2) times a day. Provider, Historical  
budesonide (PULMICORT) 0.5 mg/2 mL nbsp 500 mcg by Nebulization route two (2) times a day. Provider, Historical  
albuterol sulfate (PROVENTIL;VENTOLIN) 2.5 mg/0.5 mL nebu nebulizer solution 2.5 mg by Nebulization route four (4) times daily. Provider, Historical  
docusate sodium (COLACE) 100 mg capsule Take 100 mg by mouth two (2) times a day. Provider, Historical  
famotidine (PEPCID) 20 mg tablet Take 20 mg by mouth two (2) times a day. Provider, Historical  
melatonin 3 mg tablet Take 3 mg by mouth nightly. Provider, Historical  
iron polysaccharides (NIFEREX) 150 mg iron capsule Take 150 mg by mouth daily.     Provider, Historical  
 heparin sodium,porcine (HEPARIN, PORCINE,) 5,000 unit/mL syrg 5,000 Units by Injection route every eight (8) hours. Provider, Historical  
atorvastatin (LIPITOR) 20 mg tablet Take 20 mg by mouth nightly. Provider, Historical  
 
 
Allergies Allergen Reactions  Statins-Hmg-Coa Reductase Inhibitors Other (comments) Per wife, pt does take lipitor at home Review of Systems: 
Review of systems not obtained due to patient factors- sedated Objective:  
 
Visit Vitals /71 Pulse 91 Temp 98.3 °F (36.8 °C) Resp 20 Ht 5' 9\" (1.753 m) Wt 201 lb 8 oz (91.4 kg) SpO2 100% BMI 29.76 kg/m² Physical Exam: 
 
General:  Sedated, no distress noted Eyes:  Conjunctivae/corneas clear Nose: Nares normal. Septum midline. Neck: Supple, symmetrical, trachea midline. Trach with vent Lungs:   Decreased bilaterally Heart:  Regular rate and rhythm, no murmur Abdomen:   Soft, non-tender, non-distended Extremities: Normal, atraumatic, no cyanosis or edema Skin: Skin color, texture, turgor normal.  
Neurologic: Sedated Psych: Sedated Assessment:  
 
Hospital Problems  Date Reviewed: 1/18/2019 Codes Class Noted POA Bacteremia due to methicillin resistant Staphylococcus aureus ICD-10-CM: R78.81 ICD-9-CM: 790.7, 041.12  1/18/2019 No  
   
 Empyema lung (HCC) ICD-10-CM: J86.9 ICD-9-CM: 510.9  1/17/2019 Yes * (Principal) Acute respiratory failure with hypoxia ([address] ICD-10-CM: J96.01 
ICD-9-CM: 518.81  1/17/2019 Yes LUAN (acute kidney injury) ([address] ICD-10-CM: N17.9 ICD-9-CM: 584.9  1/17/2019 Yes Hypotension ICD-10-CM: I95.9 ICD-9-CM: 458.9  1/17/2019 Yes History of hypertension (Chronic) ICD-10-CM: Z86.79 
ICD-9-CM: V12.59  1/17/2019 Yes COPD (chronic obstructive pulmonary disease) (HCC) (Chronic) ICD-10-CM: J44.9 ICD-9-CM: 964  1/17/2019 Yes Anxiety ICD-10-CM: F41.9 ICD-9-CM: 300.00  1/17/2019 Yes Anemia ICD-10-CM: D64.9 ICD-9-CM: 285.9  1/17/2019 Yes Acute respiratory failure ([facility] ICD-10-CM: J96.00 
ICD-9-CM: 518.81  1/17/2019 Unknown Cardiac arrest [facility]) ICD-10-CM: I46.9 ICD-9-CM: 427.5  1/17/2019 No  
   
  
 
 
Signed By: Felicita Solis NP   
 January 18, 2019

## 2019-01-18 NOTE — PROGRESS NOTES
Ventilator check complete; patient has a #6.0  tracheostomy. Patient is sedated. Patient is not able to follow commands. Breath sounds are coarse. Trachea is midline, Negative for subcutaneous air, and chest excursion is symmetric. Patient is also Negative for cyanosis and is Negative for pitting edema. All alarms are set and audible. Resuscitation bag is at the head of the bed. Ventilator Settings Mode FIO2 Rate Tidal Volume Pressure PEEP I:E Ratio Pressure control  100 %   20 
     20 cm H20  16 cm H20  1:0.88 Peak airway pressure: 36.5 cm H2O Minute ventilation: 10.3 l/min Boaz Calvert RT

## 2019-01-18 NOTE — PROGRESS NOTES
Pharmacokinetic Consult to Pharmacist 
 
Maria Esther Gandhi is a 70 y.o. male being treated for sepsis. Height: 5' 9\" (175.3 cm)  Weight: 91.4 kg (201 lb 8 oz) Lab Results Component Value Date/Time BUN 94 (H) 01/18/2019 02:37 AM  
 Creatinine 1.56 (H) 01/18/2019 02:37 AM  
 WBC 11.3 (H) 01/18/2019 02:41 AM  
 Procalcitonin 0.1 12/27/2018 05:58 AM  
 Lactic acid 2.2 (HH) 01/18/2019 10:44 AM  
  
Estimated Creatinine Clearance: 52.8 mL/min (A) (based on SCr of 1.56 mg/dL (H)). CULTURES: 
All Micro Results None Lab Results Component Value Date/Time Vancomycin,trough 21.4 (HH) 01/17/2019 10:38 AM  
 
 
Day 4 of vancomycin continued from [facility].  Goal trough is 15-20. I will schedule 1500mg q24h to begin this evening. Trough result on 1/17 appears to have been on 1250mg q12h regimen. SrCr has improved some since admission. Continue to trend markers of renal function. Pharmacist will continue to follow patient. Please call with any questions. Thank you, Sadie Cho, PharmD, [facility] Clinical Pharmacist 
[phone]

## 2019-01-18 NOTE — PROGRESS NOTES
tracrium gtt infusing at 15mcg/kg/min with no breaths initiated over the vent. Epi stopped at 0319 and vaso stopped at 0451. Levo currently infusing at 30mcg/min with an arterial blood pressure reading 94/57 (71).

## 2019-01-18 NOTE — PROGRESS NOTES
Death Note Ez Marlow Admission date:  2019 Admitting Diagnosis:  Empyema; Hypotension;Acute respiratory failure ([facility] Called to evaluate patient who was found by nursing to have . On arrival patient was found to be unresponsive. Physical exam was performed and the patient was noted to be apneic, asystolic, pupils were fixed and dilated, with absent occulocephalic reflex. Patient pronounced dead at 14:01 on 19. Cause of death felt to be sepsis.  
 
Priscilla Guevara MD

## 2019-01-20 LAB
BACTERIA SPEC CULT: NORMAL
SERVICE CMNT-IMP: NORMAL

## 2019-01-21 LAB
BACTERIA SPEC CULT: ABNORMAL
GRAM STN SPEC: ABNORMAL
SERVICE CMNT-IMP: ABNORMAL
SERVICE CMNT-IMP: ABNORMAL

## 2019-02-14 LAB
FUNGUS CULTURE, RFCO2T: NEGATIVE
FUNGUS SMEAR, RFCO1T: NORMAL
FUNGUS SPEC CULT: NORMAL
FUNGUS STAIN, 188244: NORMAL
REFLEX TO ID, RFCO3T: NORMAL
SPECIMEN SOURCE: NORMAL
SPECIMEN SOURCE: NORMAL

## 2019-02-23 LAB
ACID FAST STN SPEC: NEGATIVE
MYCOBACTERIUM SPEC QL CULT: NEGATIVE
SPECIMEN PREPARATION: NORMAL
SPECIMEN SOURCE: NORMAL